# Patient Record
Sex: MALE | Race: WHITE | Employment: FULL TIME | URBAN - METROPOLITAN AREA
[De-identification: names, ages, dates, MRNs, and addresses within clinical notes are randomized per-mention and may not be internally consistent; named-entity substitution may affect disease eponyms.]

---

## 2017-01-13 DIAGNOSIS — Z30.011 ENCOUNTER FOR INITIAL PRESCRIPTION OF CONTRACEPTIVE PILLS: ICD-10-CM

## 2017-01-13 RX ORDER — NORETHINDRONE ACETATE AND ETHINYL ESTRADIOL 1MG-20(21)
1 KIT ORAL DAILY
Qty: 3 PACKET | Refills: 0 | Status: SHIPPED | OUTPATIENT
Start: 2017-01-13 | End: 2017-06-12 | Stop reason: SDUPTHER

## 2017-02-09 DIAGNOSIS — F41.9 ANXIETY: ICD-10-CM

## 2017-02-09 RX ORDER — LAMOTRIGINE 150 MG/1
150 TABLET ORAL DAILY
Qty: 90 TABLET | Refills: 1 | Status: SHIPPED | OUTPATIENT
Start: 2017-02-09 | End: 2017-05-31 | Stop reason: SDUPTHER

## 2017-02-09 RX ORDER — TRAZODONE HYDROCHLORIDE 50 MG/1
50 TABLET ORAL NIGHTLY
Qty: 90 TABLET | Refills: 1 | Status: SHIPPED | OUTPATIENT
Start: 2017-02-09 | End: 2017-06-12 | Stop reason: SDUPTHER

## 2017-05-31 DIAGNOSIS — F41.9 ANXIETY: ICD-10-CM

## 2017-05-31 RX ORDER — LAMOTRIGINE 150 MG/1
TABLET ORAL
Qty: 30 TABLET | Refills: 0 | Status: SHIPPED | OUTPATIENT
Start: 2017-05-31 | End: 2017-06-12 | Stop reason: SDUPTHER

## 2017-06-12 DIAGNOSIS — F41.9 ANXIETY: ICD-10-CM

## 2017-06-12 DIAGNOSIS — Z30.011 ENCOUNTER FOR INITIAL PRESCRIPTION OF CONTRACEPTIVE PILLS: ICD-10-CM

## 2017-06-12 RX ORDER — LAMOTRIGINE 150 MG/1
150 TABLET ORAL DAILY
Qty: 90 TABLET | Refills: 1 | Status: SHIPPED | OUTPATIENT
Start: 2017-06-12 | End: 2017-06-15 | Stop reason: SDUPTHER

## 2017-06-12 RX ORDER — NORETHINDRONE ACETATE AND ETHINYL ESTRADIOL 1MG-20(21)
1 KIT ORAL DAILY
Qty: 3 PACKET | Refills: 0 | Status: SHIPPED | OUTPATIENT
Start: 2017-06-12 | End: 2017-07-11 | Stop reason: SDUPTHER

## 2017-06-12 RX ORDER — TRAZODONE HYDROCHLORIDE 50 MG/1
50 TABLET ORAL NIGHTLY
Qty: 90 TABLET | Refills: 1 | Status: SHIPPED | OUTPATIENT
Start: 2017-06-12 | End: 2017-07-23 | Stop reason: SDUPTHER

## 2017-06-15 ENCOUNTER — OFFICE VISIT (OUTPATIENT)
Dept: FAMILY MEDICINE CLINIC | Age: 22
End: 2017-06-15

## 2017-06-15 VITALS
DIASTOLIC BLOOD PRESSURE: 86 MMHG | HEIGHT: 62 IN | SYSTOLIC BLOOD PRESSURE: 130 MMHG | OXYGEN SATURATION: 99 % | BODY MASS INDEX: 30.84 KG/M2 | TEMPERATURE: 98.6 F | HEART RATE: 90 BPM | WEIGHT: 167.6 LBS

## 2017-06-15 DIAGNOSIS — E55.9 VITAMIN D DEFICIENCY: Primary | ICD-10-CM

## 2017-06-15 DIAGNOSIS — E55.9 VITAMIN D DEFICIENCY: ICD-10-CM

## 2017-06-15 DIAGNOSIS — F41.9 ANXIETY: ICD-10-CM

## 2017-06-15 DIAGNOSIS — F51.04 PSYCHOPHYSIOLOGICAL INSOMNIA: ICD-10-CM

## 2017-06-15 LAB
A/G RATIO: 1.3 (ref 1.1–2.2)
ALBUMIN SERPL-MCNC: 4.5 G/DL (ref 3.4–5)
ALP BLD-CCNC: 67 U/L (ref 40–129)
ALT SERPL-CCNC: 9 U/L (ref 10–40)
ANION GAP SERPL CALCULATED.3IONS-SCNC: 18 MMOL/L (ref 3–16)
AST SERPL-CCNC: 10 U/L (ref 15–37)
BASOPHILS ABSOLUTE: 0.1 K/UL (ref 0–0.2)
BASOPHILS RELATIVE PERCENT: 0.6 %
BILIRUB SERPL-MCNC: 0.3 MG/DL (ref 0–1)
BUN BLDV-MCNC: 8 MG/DL (ref 7–20)
CALCIUM SERPL-MCNC: 9.6 MG/DL (ref 8.3–10.6)
CHLORIDE BLD-SCNC: 101 MMOL/L (ref 99–110)
CO2: 22 MMOL/L (ref 21–32)
CREAT SERPL-MCNC: 0.6 MG/DL (ref 0.6–1.1)
EOSINOPHILS ABSOLUTE: 0 K/UL (ref 0–0.6)
EOSINOPHILS RELATIVE PERCENT: 0.5 %
GFR AFRICAN AMERICAN: >60
GFR NON-AFRICAN AMERICAN: >60
GLOBULIN: 3.4 G/DL
GLUCOSE BLD-MCNC: 85 MG/DL (ref 70–99)
HCT VFR BLD CALC: 43.7 % (ref 36–48)
HEMOGLOBIN: 14 G/DL (ref 12–16)
LYMPHOCYTES ABSOLUTE: 2.3 K/UL (ref 1–5.1)
LYMPHOCYTES RELATIVE PERCENT: 25.3 %
MCH RBC QN AUTO: 27.3 PG (ref 26–34)
MCHC RBC AUTO-ENTMCNC: 31.9 G/DL (ref 31–36)
MCV RBC AUTO: 85.4 FL (ref 80–100)
MONOCYTES ABSOLUTE: 0.6 K/UL (ref 0–1.3)
MONOCYTES RELATIVE PERCENT: 6.2 %
NEUTROPHILS ABSOLUTE: 6.3 K/UL (ref 1.7–7.7)
NEUTROPHILS RELATIVE PERCENT: 67.4 %
PDW BLD-RTO: 14.2 % (ref 12.4–15.4)
PLATELET # BLD: 284 K/UL (ref 135–450)
PMV BLD AUTO: 8.3 FL (ref 5–10.5)
POTASSIUM SERPL-SCNC: 3.6 MMOL/L (ref 3.5–5.1)
RBC # BLD: 5.12 M/UL (ref 4–5.2)
SODIUM BLD-SCNC: 141 MMOL/L (ref 136–145)
TOTAL PROTEIN: 7.9 G/DL (ref 6.4–8.2)
TSH SERPL DL<=0.05 MIU/L-ACNC: 1.57 UIU/ML (ref 0.27–4.2)
VITAMIN D 25-HYDROXY: 19.3 NG/ML
WBC # BLD: 9.3 K/UL (ref 4–11)

## 2017-06-15 PROCEDURE — 99214 OFFICE O/P EST MOD 30 MIN: CPT | Performed by: FAMILY MEDICINE

## 2017-06-15 RX ORDER — LAMOTRIGINE 200 MG/1
200 TABLET ORAL DAILY
Qty: 90 TABLET | Refills: 1 | Status: SHIPPED | OUTPATIENT
Start: 2017-06-15 | End: 2020-12-16 | Stop reason: SDUPTHER

## 2017-06-15 ASSESSMENT — ENCOUNTER SYMPTOMS
GASTROINTESTINAL NEGATIVE: 1
EYES NEGATIVE: 1
RESPIRATORY NEGATIVE: 1

## 2017-07-11 ENCOUNTER — OFFICE VISIT (OUTPATIENT)
Dept: PSYCHOLOGY | Age: 22
End: 2017-07-11

## 2017-07-11 DIAGNOSIS — F32.89 DEPRESSIVE DISORDER, NOT ELSEWHERE CLASSIFIED: ICD-10-CM

## 2017-07-11 DIAGNOSIS — Z30.011 ENCOUNTER FOR INITIAL PRESCRIPTION OF CONTRACEPTIVE PILLS: ICD-10-CM

## 2017-07-11 DIAGNOSIS — F41.1 GENERALIZED ANXIETY DISORDER: Primary | ICD-10-CM

## 2017-07-11 PROCEDURE — 90791 PSYCH DIAGNOSTIC EVALUATION: CPT | Performed by: PSYCHOLOGIST

## 2017-07-11 RX ORDER — NORETHINDRONE ACETATE AND ETHINYL ESTRADIOL 1MG-20(21)
1 KIT ORAL DAILY
Qty: 3 PACKET | Refills: 1 | Status: SHIPPED | OUTPATIENT
Start: 2017-07-11 | End: 2017-12-15 | Stop reason: SDUPTHER

## 2017-07-11 ASSESSMENT — ANXIETY QUESTIONNAIRES
5. BEING SO RESTLESS THAT IT IS HARD TO SIT STILL: 3-NEARLY EVERY DAY
2. NOT BEING ABLE TO STOP OR CONTROL WORRYING: 3-NEARLY EVERY DAY
7. FEELING AFRAID AS IF SOMETHING AWFUL MIGHT HAPPEN: 3-NEARLY EVERY DAY
GAD7 TOTAL SCORE: 21
1. FEELING NERVOUS, ANXIOUS, OR ON EDGE: 3-NEARLY EVERY DAY
4. TROUBLE RELAXING: 3-NEARLY EVERY DAY
3. WORRYING TOO MUCH ABOUT DIFFERENT THINGS: 3-NEARLY EVERY DAY
6. BECOMING EASILY ANNOYED OR IRRITABLE: 3-NEARLY EVERY DAY

## 2017-07-11 ASSESSMENT — PATIENT HEALTH QUESTIONNAIRE - PHQ9
SUM OF ALL RESPONSES TO PHQ9 QUESTIONS 1 & 2: 2
9. THOUGHTS THAT YOU WOULD BE BETTER OFF DEAD, OR OF HURTING YOURSELF: 0
2. FEELING DOWN, DEPRESSED OR HOPELESS: 1
7. TROUBLE CONCENTRATING ON THINGS, SUCH AS READING THE NEWSPAPER OR WATCHING TELEVISION: 2
1. LITTLE INTEREST OR PLEASURE IN DOING THINGS: 1
6. FEELING BAD ABOUT YOURSELF - OR THAT YOU ARE A FAILURE OR HAVE LET YOURSELF OR YOUR FAMILY DOWN: 1
4. FEELING TIRED OR HAVING LITTLE ENERGY: 3
3. TROUBLE FALLING OR STAYING ASLEEP: 3
5. POOR APPETITE OR OVEREATING: 0
8. MOVING OR SPEAKING SO SLOWLY THAT OTHER PEOPLE COULD HAVE NOTICED. OR THE OPPOSITE, BEING SO FIGETY OR RESTLESS THAT YOU HAVE BEEN MOVING AROUND A LOT MORE THAN USUAL: 2
SUM OF ALL RESPONSES TO PHQ QUESTIONS 1-9: 13

## 2017-07-17 ENCOUNTER — TELEPHONE (OUTPATIENT)
Dept: PSYCHIATRY | Age: 22
End: 2017-07-17

## 2017-07-23 DIAGNOSIS — F51.04 PSYCHOPHYSIOLOGICAL INSOMNIA: Primary | ICD-10-CM

## 2017-07-23 RX ORDER — TRAZODONE HYDROCHLORIDE 50 MG/1
50 TABLET ORAL NIGHTLY
Qty: 90 TABLET | Refills: 1 | Status: SHIPPED | OUTPATIENT
Start: 2017-07-23 | End: 2020-12-16

## 2017-08-01 ENCOUNTER — RX ONLY (OUTPATIENT)
Age: 22
Setting detail: RX ONLY
End: 2017-08-01

## 2017-08-01 ENCOUNTER — CLINICAL DOCUMENTATION (OUTPATIENT)
Dept: PSYCHOLOGY | Age: 22
End: 2017-08-01

## 2017-12-15 DIAGNOSIS — Z30.011 ENCOUNTER FOR INITIAL PRESCRIPTION OF CONTRACEPTIVE PILLS: ICD-10-CM

## 2017-12-15 RX ORDER — NORETHINDRONE ACETATE AND ETHINYL ESTRADIOL 1MG-20(21)
1 KIT ORAL DAILY
Qty: 3 PACKET | Refills: 0 | Status: SHIPPED | OUTPATIENT
Start: 2017-12-15 | End: 2020-12-16

## 2017-12-15 NOTE — TELEPHONE ENCOUNTER
Same thing. I sent in three  month supply of your medication. Please schedule a follow up appointment with me or Denver Morocho for a pap .

## 2020-11-06 ENCOUNTER — HOSPITAL ENCOUNTER (EMERGENCY)
Age: 25
Discharge: HOME | End: 2020-11-06
Attending: EMERGENCY MEDICINE
Payer: COMMERCIAL

## 2020-11-06 VITALS
RESPIRATION RATE: 15 BRPM | TEMPERATURE: 98.2 F | WEIGHT: 132.9 LBS | HEART RATE: 77 BPM | OXYGEN SATURATION: 99 % | SYSTOLIC BLOOD PRESSURE: 134 MMHG | BODY MASS INDEX: 24.31 KG/M2 | DIASTOLIC BLOOD PRESSURE: 70 MMHG

## 2020-11-06 PROCEDURE — U0003 INFECTIOUS AGENT DETECTION BY NUCLEIC ACID (DNA OR RNA); SEVERE ACUTE RESPIRATORY SYNDROME CORONAVIRUS 2 (SARS-COV-2) (CORONAVIRUS DISEASE [COVID-19]), AMPLIFIED PROBE TECHNIQUE, MAKING USE OF HIGH THROUGHPUT TECHNOLOGIES AS DESCRIBED BY CMS-2020-01-R: HCPCS

## 2020-11-06 PROCEDURE — 99283 EMERGENCY DEPT VISIT LOW MDM: CPT

## 2020-11-06 PROCEDURE — U0002 COVID-19 LAB TEST NON-CDC: HCPCS

## 2020-11-06 RX ORDER — BENZONATATE 100 MG/1
100 CAPSULE ORAL 3 TIMES DAILY PRN
Qty: 30 CAPSULE | Refills: 0 | Status: SHIPPED | OUTPATIENT
Start: 2020-11-06 | End: 2020-11-13

## 2020-11-06 NOTE — ED NOTES
Patient to ed with complaints of URI symptoms cough, congestion, nasal congestion which started a week ago and continued.      Joann Gonzalez RN  11/06/20 9762

## 2020-11-06 NOTE — ED NOTES
Patient given prescription, work note, discharge instructions verbal and written, patient verbalized understanding. Alert/oriented X4, Clear speech.   Patient exhibits no distress, ambulates with steady gait per self leaving unit, no further request.     Lore Hines RN  11/06/20 [id number]

## 2020-11-06 NOTE — ED PROVIDER NOTES
Emergency Physician Note    Chief Complaint  Cough       History of Present Illness  Lyn Del Rosario is a 31 y.o. female who presents to the ED for cough. Patient reports cough and body aches for the last week. Tested for COVID-19 prior to the recent trip but returned with cough and body aches. Also has a sore throat and runny nose. No fevers. Patient tried to get a primary physician but was sent to urgent care who directed the patient here. No chest pain or shortness of breath. 10 systems reviewed, pertinent positives per HPI otherwise noted to be negative    I have reviewed the following from the nursing documentation:      Prior to Admission medications    Medication Sig Start Date End Date Taking? Authorizing Provider   traZODone (DESYREL) 50 MG tablet Take 1 tablet by mouth nightly 7/23/17  Yes Janeth Jones MD   lamoTRIgine (LAMICTAL) 200 MG tablet Take 1 tablet by mouth daily 6/15/17  Yes Janeth Jones MD   norethindrone-ethinyl estradiol ([facility] FE 1/20) 1-20 MG-MCG per tablet Take 1 tablet by mouth daily 12/15/17   Janeth Jones MD       Allergies as of 11/06/2020 - Review Complete 11/06/2020   Allergen Reaction Noted    Fish-derived products Hives 12/18/2014       History reviewed. No pertinent past medical history.      Surgical History:   Past Surgical History:   Procedure Laterality Date    OTHER SURGICAL HISTORY      fetal twin        Family History:    Family History   Problem Relation Age of Onset    Depression Mother     High Blood Pressure Mother     Cancer Father         skin    Mental Illness Father     Depression Father     Depression Maternal Grandmother         suicide    Early Death Maternal Grandmother     High Blood Pressure Maternal Grandmother     High Cholesterol Maternal Grandmother     Mental Illness Maternal Grandmother     Arthritis Maternal Grandfather     Heart Disease Maternal Grandfather     Kidney Disease Maternal Grandfather     Cancer Maternal SpO2 99 %      Weight 132 lb 14.4 oz (60.3 kg)      Height       Head Circumference       Peak Flow       Pain Score       Pain Loc       Pain Edu? Excl. in [address]? GENERAL:  Awake, alert. Well developed, well nourished with no apparent distress. HENT:  Normocephalic, Atraumatic, moist mucous membranes. Posterior oropharynx lightly erythematous with no edema or exudate. EYES:  Pupils equal round and reactive to light, Conjunctiva normal, extraocular movements normal.  NECK:  No meningeal signs, Supple. CHEST:  Regular rate and rhythm, chest wall non-tender. LUNGS:  Clear to auscultation bilaterally. ABDOMEN:  Soft, non-tender, no rebound, rigidity or guarding, non-distended, normal bowel sounds. No costovertebral angle tenderness to palpation. BACK:  No tenderness. EXTREMITIES:  Normal range of motion, no edema, no bony tenderness, no deformity, distal pulses present. SKIN: Warm, dry and intact. NEUROLOGIC: Normal mental status. Moving all extremities to command. LABS  Labs Reviewed   COVID-19     MEDICAL DECISION MAKING     I advised the patient to return to the emergency department immediately for any new or worsening symptoms, such as fever, chest pain or shortness of breath. The patient voiced agreement and understanding of the treatment plan. No results found for this visit on 11/06/20. I estimate there is LOW risk for EPIGLOTTITIS, PNEUMONIA, MENINGITIS, OR URINARY TRACT INFECTION, thus I consider the discharge disposition reasonable. Also, there is no evidence or peritonitis, sepsis, or toxicity. Rosamaria Castano and I have discussed the diagnosis and risks, and we agree with discharging home to follow-up with their primary doctor. We also discussed returning to the Emergency Department immediately if new or worsening symptoms occur.  We have discussed the symptoms which are most concerning (e.g., changing or worsening pain, trouble swallowing or breating, neck stiffness, fever) that necessitate immediate return. Final Impression    1. Acute upper respiratory infection        Discharge Vital Signs:  Blood pressure 134/70, pulse 77, temperature 98.2 °F (36.8 °C), temperature source Oral, resp. rate 15, weight 132 lb 14.4 oz (60.3 kg), SpO2 99 %, not currently breastfeeding. Patient was given scripts for the following medications. I counseled patient how to take these medications. New Prescriptions    BENZONATATE (TESSALON PERLES) 100 MG CAPSULE    Take 1 capsule by mouth 3 times daily as needed for Cough       Disposition  Pt is in good condition upon Discharge to home. This chart was generated using the [address] [place], [place] [address]Th  dictation system. I created this record but it may contain dictation errors.           Shanice Liriano MD  11/06/20 0013

## 2020-11-07 ENCOUNTER — CARE COORDINATION (OUTPATIENT)
Dept: CARE COORDINATION | Age: 25
End: 2020-11-07

## 2020-11-07 LAB — SARS-COV-2, PCR: NOT DETECTED

## 2020-12-16 ENCOUNTER — VIRTUAL VISIT (OUTPATIENT)
Dept: PRIMARY CARE CLINIC | Age: 25
End: 2020-12-16

## 2020-12-16 PROBLEM — F31.4 BIPOLAR DISORDER WITH SEVERE DEPRESSION (HCC): Status: ACTIVE | Noted: 2020-12-16

## 2020-12-16 PROBLEM — F64.9 GENDER DYSPHORIA: Status: ACTIVE | Noted: 2020-06-17

## 2020-12-16 PROBLEM — R53.83 FATIGUE: Status: ACTIVE | Noted: 2020-06-17

## 2020-12-16 PROCEDURE — 99204 OFFICE O/P NEW MOD 45 MIN: CPT | Performed by: NURSE PRACTITIONER

## 2020-12-16 PROCEDURE — G8431 POS CLIN DEPRES SCRN F/U DOC: HCPCS | Performed by: NURSE PRACTITIONER

## 2020-12-16 RX ORDER — TESTOSTERONE CYPIONATE 200 MG/ML
200 INJECTION INTRAMUSCULAR
COMMUNITY
Start: 2020-06-17

## 2020-12-16 RX ORDER — ALBUTEROL SULFATE 90 UG/1
2 AEROSOL, METERED RESPIRATORY (INHALATION) EVERY 6 HOURS PRN
Qty: 1 INHALER | Refills: 3 | Status: SHIPPED | OUTPATIENT
Start: 2020-12-16

## 2020-12-16 RX ORDER — LAMOTRIGINE 200 MG/1
200 TABLET ORAL DAILY
Qty: 30 TABLET | Refills: 1 | Status: SHIPPED | OUTPATIENT
Start: 2020-12-16

## 2020-12-16 SDOH — ECONOMIC STABILITY: FOOD INSECURITY: WITHIN THE PAST 12 MONTHS, THE FOOD YOU BOUGHT JUST DIDN'T LAST AND YOU DIDN'T HAVE MONEY TO GET MORE.: NEVER TRUE

## 2020-12-16 SDOH — ECONOMIC STABILITY: FOOD INSECURITY: WITHIN THE PAST 12 MONTHS, YOU WORRIED THAT YOUR FOOD WOULD RUN OUT BEFORE YOU GOT MONEY TO BUY MORE.: NEVER TRUE

## 2020-12-16 SDOH — ECONOMIC STABILITY: INCOME INSECURITY: HOW HARD IS IT FOR YOU TO PAY FOR THE VERY BASICS LIKE FOOD, HOUSING, MEDICAL CARE, AND HEATING?: NOT HARD AT ALL

## 2020-12-16 SDOH — ECONOMIC STABILITY: TRANSPORTATION INSECURITY
IN THE PAST 12 MONTHS, HAS LACK OF TRANSPORTATION KEPT YOU FROM MEETINGS, WORK, OR FROM GETTING THINGS NEEDED FOR DAILY LIVING?: NO

## 2020-12-16 SDOH — ECONOMIC STABILITY: TRANSPORTATION INSECURITY
IN THE PAST 12 MONTHS, HAS THE LACK OF TRANSPORTATION KEPT YOU FROM MEDICAL APPOINTMENTS OR FROM GETTING MEDICATIONS?: NO

## 2020-12-16 ASSESSMENT — ENCOUNTER SYMPTOMS
BLOOD IN STOOL: 0
ABDOMINAL DISTENTION: 0
NAUSEA: 0
VOMITING: 0
DIARRHEA: 0
TROUBLE SWALLOWING: 0
SHORTNESS OF BREATH: 0
VOICE CHANGE: 0
EYE PAIN: 0
CONSTIPATION: 0
CHEST TIGHTNESS: 0
BACK PAIN: 0
WHEEZING: 0
EYE DISCHARGE: 0
COUGH: 0
SORE THROAT: 0
COLOR CHANGE: 0
ABDOMINAL PAIN: 0
EYE ITCHING: 0

## 2020-12-16 ASSESSMENT — PATIENT HEALTH QUESTIONNAIRE - PHQ9
4. FEELING TIRED OR HAVING LITTLE ENERGY: 3
1. LITTLE INTEREST OR PLEASURE IN DOING THINGS: 0
5. POOR APPETITE OR OVEREATING: 3
SUM OF ALL RESPONSES TO PHQ QUESTIONS 1-9: 17
10. IF YOU CHECKED OFF ANY PROBLEMS, HOW DIFFICULT HAVE THESE PROBLEMS MADE IT FOR YOU TO DO YOUR WORK, TAKE CARE OF THINGS AT HOME, OR GET ALONG WITH OTHER PEOPLE: 2
SUM OF ALL RESPONSES TO PHQ9 QUESTIONS 1 & 2: 3
9. THOUGHTS THAT YOU WOULD BE BETTER OFF DEAD, OR OF HURTING YOURSELF: 0
8. MOVING OR SPEAKING SO SLOWLY THAT OTHER PEOPLE COULD HAVE NOTICED. OR THE OPPOSITE, BEING SO FIGETY OR RESTLESS THAT YOU HAVE BEEN MOVING AROUND A LOT MORE THAN USUAL: 0
2. FEELING DOWN, DEPRESSED OR HOPELESS: 3
SUM OF ALL RESPONSES TO PHQ QUESTIONS 1-9: 17
SUM OF ALL RESPONSES TO PHQ QUESTIONS 1-9: 17
6. FEELING BAD ABOUT YOURSELF - OR THAT YOU ARE A FAILURE OR HAVE LET YOURSELF OR YOUR FAMILY DOWN: 3
3. TROUBLE FALLING OR STAYING ASLEEP: 3
7. TROUBLE CONCENTRATING ON THINGS, SUCH AS READING THE NEWSPAPER OR WATCHING TELEVISION: 2

## 2020-12-16 ASSESSMENT — COLUMBIA-SUICIDE SEVERITY RATING SCALE - C-SSRS
6. HAVE YOU EVER DONE ANYTHING, STARTED TO DO ANYTHING, OR PREPARED TO DO ANYTHING TO END YOUR LIFE?: NO
2. HAVE YOU ACTUALLY HAD ANY THOUGHTS OF KILLING YOURSELF?: NO
1. WITHIN THE PAST MONTH, HAVE YOU WISHED YOU WERE DEAD OR WISHED YOU COULD GO TO SLEEP AND NOT WAKE UP?: NO

## 2020-12-16 NOTE — PROGRESS NOTES
2020        TELEHEALTH EVALUATION -- Audio/Visual ([facility])    HPI:    Tracy Dominguez (:  1995) has requested an audio/video evaluation for the following concern(s):    Patient seen virtually as a new patient. Has several complaints. Previous PCP was in [place], he wants to change PCP. He has had trouble scheduling apts with PCP. Still follows with [facility] for HRT    S. O.B:  Intermittent. Has been present since . Has ntermittent shortness of breath with activity. No wheezing. Has been coughing. Cough is dry. Will tire easily with minimal activty such as walking through the grocery store. Feels very fatigued. Seen in ED  for cough and body aches. Had low fever, congestion   covid test was negative. He was dx with URI and sent home with rx for tessalon. H/o history of asthma as a child. Has used his wife's inhaler and seems to help at times. WEIGHT:  Has lost \"80 lbs over the past few months\". No vomiting. No diarrhea. Has trouble eating. No appetite. Feels full quickly. Some nausea. Has been very depressed and not feeling well in general.  Very fatigued. Records in care everywhere notes he spoke with previous PCP re: wt loss in September but he never completed testing as ordered. PSYC:   PHQ9 positive. Has been dx with bipolar disorder, depression and anxiety. Never been suicidal. Depression has been pretty severe lately. Hasn't seen psyc for 2-3 years. Felt like mood was stable until a few months ago. On lamictal. Has been on several medications in the past.   SSRIs worsen his anxiety. trazadone does not help him sleep. [place] like his physical illness in November has triggered his worsening depression s/s. Does have a good support system. Is . 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 They have not allowed him time off work to [employer]" from his illness in November and now his depression is so severe, he would like to take \"some time off to recover from everything and he is just not well\". He has used all of his PTO, they have encrouaged he apply for STD/FMLA. He is asking to take 2 wks off work. He feels he can't \"get better due to the high demands of his job\"    GENDER:  On testosterone injections  Has completed gender transition female to male. Sees specialist at [facility]. Sees every 6mo  Has labs every 6mo  No longer on birth control. Is on \"estrogen blocker\". FH:  +leukemia (GM), lung CA. No DMII. No CAD. Review of Systems   Constitutional: Negative for activity change, appetite change, chills, fatigue and unexpected weight change. HENT: Negative for congestion, ear pain, hearing loss, nosebleeds, postnasal drip, sneezing, sore throat, trouble swallowing and voice change. Eyes: Negative for pain, discharge and itching. Respiratory: Negative for cough, chest tightness, shortness of breath and wheezing. Cardiovascular: Negative for chest pain, palpitations and leg swelling. Gastrointestinal: Negative for abdominal distention, abdominal pain, blood in stool, constipation, diarrhea, nausea and vomiting. Endocrine: Negative for cold intolerance and heat intolerance. Genitourinary: Negative for difficulty urinating and dysuria. Musculoskeletal: Negative for arthralgias, back pain and neck pain. Skin: Negative for color change, pallor and rash. Neurological: Negative for dizziness, tremors, numbness and headaches. Hematological: Does not bruise/bleed easily. Psychiatric/Behavioral: Negative for agitation and sleep disturbance. The patient is not nervous/anxious. Prior to Visit Medications    Medication Sig Taking?  Authorizing Provider Neurological:        [x] No Facial Asymmetry (Cranial nerve 7 motor function) (limited exam to video visit)          [x] No gaze palsy        [] Abnormal-         Skin:        [x] No significant exanthematous lesions or discoloration noted on facial skin         [] Abnormal-            Psychiatric:       [] Normal Affect [x] No Hallucinations        [] Abnormal- PATIENT TEARFUL, SOFT SPOKEN, DEPRESSED MOOD, ANXIOUS    Other pertinent observable physical exam findings-     PHQ-9 Total Score: 17 (12/16/2020  5:44 PM)  Thoughts that you would be better off dead, or of hurting yourself in some way: 0 (12/16/2020  5:44 PM)    On the basis of positive PHQ-9 screening (PHQ-9 Total Score: 17), the following plan was implemented: referral to Behavioral health provided. Patient will follow-up in 2 week(s) with psychiatrist and PCP    ASSESSMENT/PLAN:  1. Unexplained weight loss  CHECK LABS. PROCEED PENDING RESULTS. CHECK CXR. IF NEGATIVE WORKUP, WILL CONSIDER ABD/PELVIS CT AND/OR GI REFERRAL FOR FURTHER WORKUP.   - CBC; Future  - Comprehensive Metabolic Panel; Future  - TSH without Reflex; Future  - T4, Free; Future  - HIV Screen; Future    2. Bipolar disorder with severe depression ([facility] [address]  REFER TO [facility] MANAGEMENT. WILL MAIL LIST OF PROVIDERS. ENCOURAGED TO SCHEDULE APT ASAP. DISCUSSED POSSIBILITY OF SEVERE DEPRESSION CONTRIBUTING TO SIGNIFICANT WT LOSS. PATIENT NOT MENTALLY STABLE AT THIS TIME. WILL SUPPORT HIM TAKING TIME OFF OF WORK. WILL COMPLETE STD/FMLA PAPERS. WILL SUPPORT YEE X 2 WKS, THEN REASSESS. ADVISED IF ANY SI/PLAN DEVELOPS, GO TO ED. Std will fax forms. Patient will email fmla papers. YEE 12/21 THROUGH 1/4    - lamoTRIgine (LAMICTAL) 200 MG tablet; Take 1 tablet by mouth daily  Dispense: 30 tablet; Refill: 1  - Amb External Referral To Psychiatry  - Positive Screen for Clinical Depression with a Documented Follow-up Plan     3.  Gender dysphoria Debby Nickerson is a 31 y.o. adult being evaluated by a Virtual Visit (video visit) encounter to address concerns as mentioned above. A caregiver was present when appropriate. Due to this being a TeleHealth encounter (During [facility]-21 public health emergency), evaluation of the following organ systems was limited: Vitals/Constitutional/EENT/Resp/CV/GI//MS/Neuro/Skin/Heme-Lymph-Imm. Pursuant to the emergency declaration under the [address] [place] [address], [address] [place] [address] and the [employer] Act, this Virtual Visit was conducted with patient's (and/or legal guardian's) consent, to reduce the patient's risk of exposure to COVID-19 and provide necessary medical care. The patient (and/or legal guardian) has also been advised to contact this office for worsening conditions or problems, and seek emergency medical treatment and/or call 911 if deemed necessary. Patient identification was verified at the start of the visit: Yes    Total time spent on this encounter: 60 min     Services were provided through a video synchronous discussion virtually to substitute for in-person clinic visit. Patient and provider were located at their individual homes. --JOHANNE Page CNP on 12/16/2020 at 7:15 PM    An electronic signature was used to authenticate this note.

## 2020-12-17 ENCOUNTER — TELEPHONE (OUTPATIENT)
Dept: PRIMARY CARE CLINIC | Age: 25
End: 2020-12-17

## 2020-12-17 NOTE — PATIENT INSTRUCTIONS
Check labs asap. Orders entered. Check chest x ray. Order entered. Call to schedule apt with psychiatrist asap. See enclosed list of providers. If any suicidal thoughts/plan go to ED immediately. If labs and CXR are negative, will check ECHO. Will complete STD forms once received. Will complete FLMA papers once received. Psychiatry    Psychology Today  Resource to find providers  Www. [email or web address]. [facility]  [phone]    [address] [place] [address] [facility] [address]   [place], [address] [facility]   Phone: [phone]    Fax: [phone] Psychological [facility] Counseling Services  [address]   [phone]    MD Dr. Suy[address]ona [address]., MD Joeline Nicks, MD  [address],   [address]   [phone]    Alecia Keane, PhD  Apple Stein, PhD  Dayanna Bush, PhD  [address] [facility]  [phone]    [address] [place] [address] [address]  [phone]    [address], [address] [facility]  [phone]    Dr. Radha Ramsay  [phone] (outpatient)      Dr. Nichelle Cobb, [place]  [phone]    Dr. Isabelle Hatch MD  [address] [facility].  [address]  [phone]    Dr. Cj Funes MD  [facility] [address]  [address]    Diana Munoz MD   [phone]    [facility] for [facility]  [phone]    [facility]  Dr. Kelby Ross  [phone] [address] [facility] [address], [address] [place] [address].   [place], [address] [place] [address]  Phone [phone], Fax [phone]    Substance Abuse  [facility] [address]  Phone  [phone]  Walk-in treatment assessments Monday- Friday 8AM-2PM      familia [address] [place] [address].   [address]  Phone [phone]

## 2020-12-17 NOTE — TELEPHONE ENCOUNTER
[employer] called into verify the condition of the patient and that he was seen for shortness of breath and other medical information. Reference for the claim # is [id number]. Would like a call back to confirm.

## 2020-12-21 NOTE — TELEPHONE ENCOUNTER
Form updated and returned to patient. Also received STD forms, completed, scanned to chart and faxed. Will scan copy to patient as well.

## 2020-12-29 ENCOUNTER — HOSPITAL ENCOUNTER (OUTPATIENT)
Dept: GENERAL RADIOLOGY | Age: 25
Discharge: HOME | End: 2020-12-29
Payer: COMMERCIAL

## 2020-12-29 ENCOUNTER — HOSPITAL ENCOUNTER (OUTPATIENT)
Age: 25
Discharge: HOME | End: 2020-12-29
Payer: COMMERCIAL

## 2020-12-29 PROCEDURE — 71046 X-RAY EXAM CHEST 2 VIEWS: CPT

## 2020-12-30 RX ORDER — ERGOCALCIFEROL 1.25 MG/1
50000 CAPSULE ORAL WEEKLY
Qty: 12 CAPSULE | Refills: 1 | Status: SHIPPED | OUTPATIENT
Start: 2020-12-30

## 2021-01-04 ENCOUNTER — OFFICE VISIT (OUTPATIENT)
Dept: PRIMARY CARE CLINIC | Age: 26
End: 2021-01-04
Payer: COMMERCIAL

## 2021-01-04 VITALS
OXYGEN SATURATION: 98 % | BODY MASS INDEX: 24.11 KG/M2 | HEIGHT: 62 IN | HEART RATE: 88 BPM | DIASTOLIC BLOOD PRESSURE: 80 MMHG | TEMPERATURE: 94.7 F | SYSTOLIC BLOOD PRESSURE: 120 MMHG | WEIGHT: 131 LBS

## 2021-01-04 DIAGNOSIS — E55.9 VITAMIN D DEFICIENCY: ICD-10-CM

## 2021-01-04 DIAGNOSIS — F41.9 ANXIETY: ICD-10-CM

## 2021-01-04 DIAGNOSIS — F31.4 BIPOLAR DISORDER WITH SEVERE DEPRESSION (HCC): ICD-10-CM

## 2021-01-04 DIAGNOSIS — R06.02 SOB (SHORTNESS OF BREATH): Primary | ICD-10-CM

## 2021-01-04 DIAGNOSIS — R53.83 FATIGUE, UNSPECIFIED TYPE: ICD-10-CM

## 2021-01-04 PROCEDURE — 99214 OFFICE O/P EST MOD 30 MIN: CPT | Performed by: NURSE PRACTITIONER

## 2021-01-04 RX ORDER — MIRTAZAPINE 7.5 MG/1
7.5 TABLET, FILM COATED ORAL NIGHTLY
COMMUNITY
Start: 2020-12-21

## 2021-01-04 RX ORDER — HYDROXYZINE PAMOATE 25 MG/1
25 CAPSULE ORAL 3 TIMES DAILY PRN
COMMUNITY
Start: 2020-12-21

## 2021-01-04 RX ORDER — ARIPIPRAZOLE 2 MG/1
2 TABLET ORAL DAILY
COMMUNITY
Start: 2020-12-22

## 2021-01-04 RX ORDER — MIRTAZAPINE 7.5 MG/1
1 TABLET, FILM COATED ORAL NIGHTLY
COMMUNITY
Start: 2020-12-21

## 2021-01-04 ASSESSMENT — ENCOUNTER SYMPTOMS
ABDOMINAL PAIN: 0
SHORTNESS OF BREATH: 0
CONSTIPATION: 0
EYE DISCHARGE: 0
BACK PAIN: 0
CHEST TIGHTNESS: 0
COUGH: 0
EYE PAIN: 0
WHEEZING: 0
DIARRHEA: 0
BLOOD IN STOOL: 0
COLOR CHANGE: 0
VOICE CHANGE: 0
ABDOMINAL DISTENTION: 0
TROUBLE SWALLOWING: 0
EYE ITCHING: 0
NAUSEA: 0
VOMITING: 0
SORE THROAT: 0

## 2021-01-04 ASSESSMENT — PATIENT HEALTH QUESTIONNAIRE - PHQ9
SUM OF ALL RESPONSES TO PHQ QUESTIONS 1-9: 0
SUM OF ALL RESPONSES TO PHQ QUESTIONS 1-9: 0

## 2021-01-04 NOTE — PATIENT INSTRUCTIONS
Continue per specialists. Refer to pulmonology for further evaluation of shortness of breath  Call for apt. Extend YEE from work through 1/25/21. Start on vitamin D rx asap. Closely monitor weight. Eat calorie dense foods.

## 2021-01-04 NOTE — PROGRESS NOTES
ENCOUNTER DATE: 1/4/2021     NAME: Junior Riley   AGE: 31 y.o. GENDER: adult   YOB: 1995    Patient Active Problem List   Diagnosis    Anxiety    Vitamin D deficiency    Nevus    Allergic rhinitis    Generalized anxiety disorder    Psychophysiological insomnia    Gender dysphoria    Fatigue    Bipolar disorder with severe depression ([address]      Allergies   Allergen Reactions    Nicotine Shortness Of Breath    Fish-Derived Products Hives    Shellfish Allergy Hives     Current Outpatient Medications on File Prior to Visit   Medication Sig Dispense Refill    ARIPiprazole (ABILIFY) 2 MG tablet Take 2 mg by mouth daily      mirtazapine (REMERON) 7.5 MG tablet Take 7.5 mg by mouth nightly      hydrOXYzine (VISTARIL) 25 MG capsule Take 25 mg by mouth 3 times daily as needed      vitamin D (ERGOCALCIFEROL) 1.25 MG (82023 UT) CAPS capsule Take 1 capsule by mouth once a week 12 capsule 1    albuterol sulfate HFA (PROVENTIL HFA) 108 (90 Base) MCG/ACT inhaler Inhale 2 puffs into the lungs every 6 hours as needed for Wheezing 1 Inhaler 3    lamoTRIgine (LAMICTAL) 200 MG tablet Take 1 tablet by mouth daily 30 tablet 1    testosterone cypionate (DEPOTESTOTERONE CYPIONATE) 200 MG/ML injection Inject 200 mg into the muscle every 7 days.  mirtazapine (REMERON) 7.5 MG tablet Take 1 tablet by mouth nightly       No current facility-administered medications on file prior to visit.          Social History     Tobacco Use    Smoking status: Never Smoker    Smokeless tobacco: Never Used   Substance Use Topics    Alcohol use: No     Alcohol/week: 0.0 standard drinks      CARE TEAM  Patient Care Team:  JOHANNE Mendieta CNP as PCP - General (Family Nurse Practitioner)  JOHANNE Mendieta CNP as PCP - REHABILITATION [facility] Empaneled Provider    Chief Complaint   Patient presents with    Depression     disabliity   papers  seeing  phyciatrist  tonight    Anxiety    Shortness of Breath        HPI: Patient is here for a 2 wk follow up visit. Seen virtually for initial apt    S. O.B  Intermittent. Has been present since November illness  Recent CXR was normal. Using inhaler prn. Feels like breathing is better, but still shortness of breath with activity and is new for him. Using inhaler prn and feels this helps. No known h/o asthma. WT:  Has lost \"80 lbs over the past few months\". No further wt loss. Has actually gained a few lbs since being discharged from [facility]. FATIGUE:  Has improved slightly, but still feels pretty fatigued. PSYC:  PHQ9 positive. Referred to psyc. He started feeling worse, so he went to [facility] on his own. He was admitted for a few days. Set up apt with psyc and counselor. Has apt today and tomorrow. Started him on remeron 7.5mg at night and abilify and vistaril as needed. Continued on lamictal.   Has been on several medications in the past.   SSRIs worsen his anxiety. trazadone does not help him sleep. Feels like he is stressed, but is overall doing better. Is starting to stress more thinking about going back to work. Had highly demanding job and stressful. Feels like he needs a couple more weeks off for meds to become effective and rest further. VIT D:  Hasn't started rx yet. ROS:  Review of Systems   Constitutional: Negative for activity change, appetite change, chills, fatigue and unexpected weight change. HENT: Negative for congestion, ear pain, hearing loss, nosebleeds, postnasal drip, sneezing, sore throat, trouble swallowing and voice change. Eyes: Negative for pain, discharge and itching. Respiratory: Negative for cough, chest tightness, shortness of breath and wheezing. Cardiovascular: Negative for chest pain, palpitations and leg swelling. Gastrointestinal: Negative for abdominal distention, abdominal pain, blood in stool, constipation, diarrhea, nausea and vomiting.    Endocrine: Negative for cold intolerance and heat intolerance. Genitourinary: Negative for difficulty urinating and dysuria. Musculoskeletal: Negative for arthralgias, back pain and neck pain. Skin: Negative for color change, pallor and rash. Neurological: Negative for dizziness, tremors, numbness and headaches. Hematological: Does not bruise/bleed easily. Psychiatric/Behavioral: Positive for dysphoric mood and sleep disturbance. Negative for agitation, self-injury and suicidal ideas. The patient is nervous/anxious. VITALS:  /80   Pulse 88   Temp 94.7 °F (34.8 °C) (Oral)   Ht 5' 2\" (1.575 m)   Wt 131 lb (59.4 kg)   SpO2 98%   BMI 23.96 kg/m²    Wt Readings from Last 3 Encounters:   01/04/21 131 lb (59.4 kg)   11/06/20 132 lb 14.4 oz (60.3 kg)   06/15/17 167 lb 9.6 oz (76 kg)       PE:  Physical Exam  Vitals signs and nursing note reviewed. Constitutional:       General: He is not in acute distress. Appearance: Normal appearance. He is well-developed and normal weight. He is not diaphoretic. HENT:      Head: Normocephalic and atraumatic. Eyes:      Conjunctiva/sclera: Conjunctivae normal.      Pupils: Pupils are equal, round, and reactive to light. Neck:      Musculoskeletal: Normal range of motion and neck supple. Thyroid: No thyromegaly. Vascular: No carotid bruit or JVD. Trachea: No tracheal deviation. Cardiovascular:      Rate and Rhythm: Normal rate and regular rhythm. Heart sounds: Normal heart sounds. No murmur. No friction rub. Pulmonary:      Effort: Pulmonary effort is normal. No respiratory distress. Breath sounds: Normal breath sounds. No stridor. No wheezing, rhonchi or rales. Chest:      Chest wall: No tenderness. Abdominal:      General: Abdomen is flat. Bowel sounds are normal. There is no distension. Palpations: Abdomen is soft. There is no mass. Tenderness: There is no abdominal tenderness. There is no guarding or rebound. Musculoskeletal: Normal range of motion. General: No deformity. Right lower leg: No edema. Left lower leg: No edema. Lymphadenopathy:      Cervical: No cervical adenopathy. Skin:     General: Skin is warm and dry. Coloration: Skin is not pale. Findings: No erythema or rash. Neurological:      General: No focal deficit present. Mental Status: He is alert and oriented to person, place, and time. Cranial Nerves: No cranial nerve deficit. Motor: No weakness. Gait: Gait normal.   Psychiatric:         Behavior: Behavior normal.         Thought Content: Thought content normal.         Judgment: Judgment normal.      Comments: Depressed mood. Not tearful today          Lab Results   Component Value Date    WBC 5.0 12/29/2020    HGB 15.1 12/29/2020    HCT 45.7 12/29/2020    MCV 87.2 12/29/2020     12/29/2020     Lab Results   Component Value Date     12/29/2020    K 4.5 12/29/2020     12/29/2020    CO2 29 12/29/2020    BUN 9 12/29/2020    CREATININE 0.9 12/29/2020    GLUCOSE 80 12/29/2020    CALCIUM 10.1 12/29/2020    PROT 7.3 12/29/2020    LABALBU 4.9 12/29/2020    BILITOT 0.3 12/29/2020    ALKPHOS 88 12/29/2020    AST 15 12/29/2020    ALT 15 12/29/2020    LABGLOM >60 12/29/2020    GFRAA >60 12/29/2020    AGRATIO 2.0 12/29/2020    GLOB 2.4 12/29/2020     No results found for: [id number]  Lab Results   Component Value Date    TSH 1.07 12/29/2020    T4FREE 1.0 12/29/2020     No results found for: [id number]      ASSESSMENT/PLAN:  1. SOB (shortness of breath)  Recent CXR and labs reviewed and were normal. Since he does get little relief from rescue inhaler, will refer to pulmonology for further workup. [facility] Pulmonology    2. Fatigue, unspecified type  Reviewed and discussed labs. Encouraged rest.     3. Bipolar disorder with severe depression ([address]  Continue per psyc. No records available from [facility]. Seeing psyc and counselor. Med regimen recently adjusted.  Will extend YEE from work until 1/25/21.     4. Vitamin D deficiency  Will  rx and start as directed. 5. Anxiety  Continue per psyc    6. Wt loss  Improving. Will closely monitor. Return in about 2 weeks (around 1/18/2021) for YEE follow up on 1/19 or 1/22.      Electronically signed by JOHANNE Miles CNP on 1/4/2021 at 3:16 PM

## 2021-01-21 ENCOUNTER — OFFICE VISIT (OUTPATIENT)
Dept: PRIMARY CARE CLINIC | Age: 26
End: 2021-01-21
Payer: COMMERCIAL

## 2021-01-21 VITALS
RESPIRATION RATE: 16 BRPM | OXYGEN SATURATION: 98 % | BODY MASS INDEX: 23.41 KG/M2 | WEIGHT: 128 LBS | SYSTOLIC BLOOD PRESSURE: 112 MMHG | HEART RATE: 75 BPM | DIASTOLIC BLOOD PRESSURE: 76 MMHG | TEMPERATURE: 97.3 F

## 2021-01-21 DIAGNOSIS — F31.4 BIPOLAR DISORDER WITH SEVERE DEPRESSION (HCC): ICD-10-CM

## 2021-01-21 DIAGNOSIS — R63.4 UNEXPLAINED WEIGHT LOSS: Primary | ICD-10-CM

## 2021-01-21 DIAGNOSIS — R06.02 SOB (SHORTNESS OF BREATH): ICD-10-CM

## 2021-01-21 DIAGNOSIS — F32.A DEPRESSIVE DISORDER: ICD-10-CM

## 2021-01-21 PROCEDURE — 99214 OFFICE O/P EST MOD 30 MIN: CPT | Performed by: NURSE PRACTITIONER

## 2021-01-21 ASSESSMENT — ENCOUNTER SYMPTOMS
DIARRHEA: 0
SHORTNESS OF BREATH: 0
NAUSEA: 0
TROUBLE SWALLOWING: 0
CHEST TIGHTNESS: 0
BACK PAIN: 0
ABDOMINAL DISTENTION: 0
VOMITING: 0
EYE DISCHARGE: 0
SORE THROAT: 0
VOICE CHANGE: 0
COUGH: 0
COLOR CHANGE: 0
EYE PAIN: 0
EYE ITCHING: 0
BLOOD IN STOOL: 0
ABDOMINAL PAIN: 0
CONSTIPATION: 0
WHEEZING: 0

## 2021-01-21 NOTE — PATIENT INSTRUCTIONS
Refer to GI for further workup for unexplained wt loss. May return to work 1/25 without restrictions. Will complete form and return. Continue per psyc. Continue with pulmonology apt as scheduled.

## 2021-01-21 NOTE — PROGRESS NOTES
ENCOUNTER DATE: 1/21/2021     NAME: Taj Leyva   AGE: 31 y.o. GENDER: adult   YOB: 1995    Patient Active Problem List   Diagnosis    Anxiety    Vitamin D deficiency    Nevus    Allergic rhinitis    Generalized anxiety disorder    Psychophysiological insomnia    Gender dysphoria    Fatigue    Bipolar disorder with severe depression ([facility] [address]      Allergies   Allergen Reactions    Nicotine Shortness Of Breath    Fish-Derived Products Hives    Shellfish Allergy Hives     Current Outpatient Medications on File Prior to Visit   Medication Sig Dispense Refill    ARIPiprazole (ABILIFY) 2 MG tablet Take 2 mg by mouth daily      mirtazapine (REMERON) 7.5 MG tablet Take 7.5 mg by mouth nightly      hydrOXYzine (VISTARIL) 25 MG capsule Take 25 mg by mouth 3 times daily as needed      mirtazapine (REMERON) 7.5 MG tablet Take 1 tablet by mouth nightly      vitamin D (ERGOCALCIFEROL) 1.25 MG (50944 UT) CAPS capsule Take 1 capsule by mouth once a week 12 capsule 1    albuterol sulfate HFA (PROVENTIL HFA) 108 (90 Base) MCG/ACT inhaler Inhale 2 puffs into the lungs every 6 hours as needed for Wheezing 1 Inhaler 3    lamoTRIgine (LAMICTAL) 200 MG tablet Take 1 tablet by mouth daily 30 tablet 1    testosterone cypionate (DEPOTESTOTERONE CYPIONATE) 200 MG/ML injection Inject 200 mg into the muscle every 7 days. No current facility-administered medications on file prior to visit. Social History     Tobacco Use    Smoking status: Never Smoker    Smokeless tobacco: Never Used   Substance Use Topics    Alcohol use: No     Alcohol/week: 0.0 standard drinks      CARE TEAM  Patient Care Team:  JOHANNE Crowley CNP as PCP - General (Family Nurse Practitioner)  JOHANNE Crowley CNP as PCP - [facility] Empaneled Provider    Chief Complaint   Patient presents with    Follow-up      HPI:   Patient is here for a follow up visit.      S.O.B:  Referred to pulmonology for continued shortness of breath. Scheduled for 2/1. Recent CXR was normal. Using inhaler prn. Feels like breathing is better, but still shortness of breath with activity and is new for him. Using inhaler prn and feels this helps. Using inhaler about bid -tid. No known h/o asthma. WT:  Has lost 3 lbs since last visit. Has lost 80 lbs within the past year and has not been trying  Has been eating at least 2,000cal/day. Appetite is good and is eating frequently. Eating snacks. Is overall feeling better. Unsure of family history. Never had colonoscopy. No diarrhea. No n/v/d. No abdominal pain. PSYC:  Recent admission to [facility]  Was started on remeron 7.5mg qhs, abilify and vistaril prn. Takes vistaril only prn. Continued on lamictal.   Feeling more anxious about going back to work, but has overall mood has improved. Sleeping well. No further jittery feeling with the new meds. Was told he should gain wt with new meds. Following with psyc and counselor. Has upcoming apt. YEE was extended to 1/25 to give more time for meds to become more effective and to rest.     VIT D:  rx started. ROS:  Review of Systems   Constitutional: Negative for activity change, appetite change, chills, fatigue and unexpected weight change. HENT: Negative for congestion, ear pain, hearing loss, nosebleeds, postnasal drip, sneezing, sore throat, trouble swallowing and voice change. Eyes: Negative for pain, discharge and itching. Respiratory: Negative for cough, chest tightness, shortness of breath and wheezing. Cardiovascular: Negative for chest pain, palpitations and leg swelling. Gastrointestinal: Negative for abdominal distention, abdominal pain, blood in stool, constipation, diarrhea, nausea and vomiting. Endocrine: Negative for cold intolerance and heat intolerance. Genitourinary: Negative for difficulty urinating and dysuria. Musculoskeletal: Negative for arthralgias, back pain and neck pain. Skin: Negative for color change, pallor and rash. Neurological: Negative for dizziness, tremors, numbness and headaches. Hematological: Does not bruise/bleed easily. Psychiatric/Behavioral: Positive for dysphoric mood. Negative for agitation, self-injury, sleep disturbance and suicidal ideas. The patient is nervous/anxious. VITALS:  /76   Pulse 75   Temp 97.3 °F (36.3 °C)   Resp 16   Wt 128 lb (58.1 kg)   SpO2 98%   BMI 23.41 kg/m²    Wt Readings from Last 3 Encounters:   01/21/21 128 lb (58.1 kg)   01/04/21 131 lb (59.4 kg)   11/06/20 132 lb 14.4 oz (60.3 kg)     PE:  Physical Exam  Vitals signs and nursing note reviewed. Constitutional:       General: He is not in acute distress. Appearance: Normal appearance. He is well-developed and normal weight. He is not diaphoretic. HENT:      Head: Normocephalic and atraumatic. Neck:      Musculoskeletal: Normal range of motion and neck supple. Thyroid: No thyromegaly. Vascular: No carotid bruit or JVD. Trachea: No tracheal deviation. Cardiovascular:      Rate and Rhythm: Normal rate and regular rhythm. Heart sounds: Normal heart sounds. No murmur. No friction rub. Pulmonary:      Effort: Pulmonary effort is normal. No respiratory distress. Breath sounds: Normal breath sounds. No stridor. No wheezing, rhonchi or rales. Chest:      Chest wall: No tenderness. Abdominal:      General: Abdomen is flat. Bowel sounds are normal. There is no distension. Palpations: Abdomen is soft. There is no mass. Tenderness: There is no abdominal tenderness. There is no guarding or rebound. Musculoskeletal: Normal range of motion. General: No deformity. Right lower leg: No edema. Left lower leg: No edema. Lymphadenopathy:      Cervical: No cervical adenopathy. Skin:     General: Skin is warm and dry. Coloration: Skin is not pale. Findings: No erythema or rash.    Neurological: General: No focal deficit present. Mental Status: He is alert and oriented to person, place, and time. Cranial Nerves: No cranial nerve deficit. Motor: No weakness. Gait: Gait normal.   Psychiatric:         Mood and Affect: Mood normal.         Behavior: Behavior normal.         Thought Content: Thought content normal.         Judgment: Judgment normal.      Comments: Much improved today        PA AND LATERAL CHEST 2 VIEWS       INDICATIONS: Shortness of breath       Comparison: None       Trachea is midline. Heart and mediastinal contour are normal.       Lungs and pleural surfaces are clear. Bony structures are normal           Impression   1. No acute cardiopulmonary abnormalities.           Lab Results   Component Value Date    WBC 5.0 12/29/2020    HGB 15.1 12/29/2020    HCT 45.7 12/29/2020    MCV 87.2 12/29/2020     12/29/2020     Lab Results   Component Value Date     12/29/2020    K 4.5 12/29/2020     12/29/2020    CO2 29 12/29/2020    BUN 9 12/29/2020    CREATININE 0.9 12/29/2020    GLUCOSE 80 12/29/2020    CALCIUM 10.1 12/29/2020    PROT 7.3 12/29/2020    LABALBU 4.9 12/29/2020    BILITOT 0.3 12/29/2020    ALKPHOS 88 12/29/2020    AST 15 12/29/2020    ALT 15 12/29/2020    LABGLOM >60 12/29/2020    GFRAA >60 12/29/2020    AGRATIO 2.0 12/29/2020    GLOB 2.4 12/29/2020     Component Value Ref Range & Units Status Collected Lab   HIV Ag/Ab Non-Reactive  Non-reactive Final 12/29/2020 11:20 AM [facility]   HIV-1 Antibody Non-Reactive  Non-reactive Final 12/29/2020 11:20 AM [facility]   HIV ANTIGEN Non-Reactive  Non-reactive Final 12/29/2020 11:20 AM [facility]   HIV-2 Ab Non-Reactive  Non-reactive        Lab Results   Component Value Date    TSH 1.07 12/29/2020    T4FREE 1.0 12/29/2020       ASSESSMENT/PLAN:  1. Unexplained weight loss  Refer to GI for further workup. Reported wt loss of 80lbs in the past year.    Wt has continued to decrease despite his increase in calorie intake. Continue to track calories and follow >2,000cal/day. Add supplement shakes to diet. Unknown family history. ?need for scope/further testing. Contact information given. Will call for apt.   - Chance Madrigal MD, Gastroenterology, [facility]    2. SOB (shortness of breath)  Scheduled with pulmonology 2/1. Continue with inhaler for prn use. No refill needed. 3. Bipolar disorder with severe depression ([address]  Continue per psyc for med management. Doing much better. Ready to return to work  Will complete return to work form and fax back. Release back to work starting 1/25/21. 4. Depressive disorder  Continue per psyc for med management. Return in about 4 weeks (around 2/18/2021) for chronic visit.      Electronically signed by JOHANNE Robles CNP on 1/21/2021 at 10:06 AM

## 2021-02-12 ENCOUNTER — APPOINTMENT (OUTPATIENT)
Dept: GENERAL RADIOLOGY | Age: 26
End: 2021-02-12
Payer: COMMERCIAL

## 2021-02-12 ENCOUNTER — HOSPITAL ENCOUNTER (EMERGENCY)
Age: 26
Discharge: HOME | End: 2021-02-12
Attending: EMERGENCY MEDICINE
Payer: COMMERCIAL

## 2021-02-12 VITALS
BODY MASS INDEX: 23.28 KG/M2 | HEART RATE: 89 BPM | DIASTOLIC BLOOD PRESSURE: 75 MMHG | OXYGEN SATURATION: 100 % | SYSTOLIC BLOOD PRESSURE: 124 MMHG | RESPIRATION RATE: 18 BRPM | TEMPERATURE: 97.9 F | WEIGHT: 127.3 LBS

## 2021-02-12 DIAGNOSIS — R00.2 PALPITATIONS: Primary | ICD-10-CM

## 2021-02-12 DIAGNOSIS — R51.9 ACUTE NONINTRACTABLE HEADACHE, UNSPECIFIED HEADACHE TYPE: ICD-10-CM

## 2021-02-12 LAB
A/G RATIO: 1.7 (ref 1.1–2.2)
ALBUMIN SERPL-MCNC: 4.5 G/DL (ref 3.4–5)
ALP BLD-CCNC: 77 U/L (ref 40–129)
ALT SERPL-CCNC: 15 U/L (ref 10–40)
AMPHETAMINE SCREEN, URINE: ABNORMAL
ANION GAP SERPL CALCULATED.3IONS-SCNC: 11 MMOL/L (ref 3–16)
AST SERPL-CCNC: 15 U/L (ref 15–37)
BARBITURATE SCREEN URINE: ABNORMAL
BASOPHILS ABSOLUTE: 0 K/UL (ref 0–0.2)
BASOPHILS RELATIVE PERCENT: 0.3 %
BENZODIAZEPINE SCREEN, URINE: ABNORMAL
BILIRUB SERPL-MCNC: 0.3 MG/DL (ref 0–1)
BUN BLDV-MCNC: 11 MG/DL (ref 7–20)
CALCIUM SERPL-MCNC: 9.7 MG/DL (ref 8.3–10.6)
CANNABINOID SCREEN URINE: POSITIVE
CHLORIDE BLD-SCNC: 108 MMOL/L (ref 99–110)
CO2: 26 MMOL/L (ref 21–32)
COCAINE METABOLITE SCREEN URINE: ABNORMAL
CREAT SERPL-MCNC: 0.8 MG/DL (ref 0.9–1.3)
EKG ATRIAL RATE: 115 BPM
EKG DIAGNOSIS: NORMAL
EKG P AXIS: 53 DEGREES
EKG P-R INTERVAL: 150 MS
EKG Q-T INTERVAL: 322 MS
EKG QRS DURATION: 82 MS
EKG QTC CALCULATION (BAZETT): 445 MS
EKG R AXIS: 69 DEGREES
EKG T AXIS: 38 DEGREES
EKG VENTRICULAR RATE: 115 BPM
EOSINOPHILS ABSOLUTE: 0 K/UL (ref 0–0.6)
EOSINOPHILS RELATIVE PERCENT: 0.5 %
GFR AFRICAN AMERICAN: >60
GFR NON-AFRICAN AMERICAN: >60
GLOBULIN: 2.6 G/DL
GLUCOSE BLD-MCNC: 133 MG/DL (ref 70–99)
HCT VFR BLD CALC: 41.9 % (ref 40.5–52.5)
HEMOGLOBIN: 13.9 G/DL (ref 13.5–17.5)
LYMPHOCYTES ABSOLUTE: 1.8 K/UL (ref 1–5.1)
LYMPHOCYTES RELATIVE PERCENT: 19.2 %
Lab: ABNORMAL
MCH RBC QN AUTO: 28.9 PG (ref 26–34)
MCHC RBC AUTO-ENTMCNC: 33.2 G/DL (ref 31–36)
MCV RBC AUTO: 87 FL (ref 80–100)
METHADONE SCREEN, URINE: ABNORMAL
MONOCYTES ABSOLUTE: 0.6 K/UL (ref 0–1.3)
MONOCYTES RELATIVE PERCENT: 6.4 %
NEUTROPHILS ABSOLUTE: 6.9 K/UL (ref 1.7–7.7)
NEUTROPHILS RELATIVE PERCENT: 73.6 %
OPIATE SCREEN URINE: ABNORMAL
OXYCODONE URINE: ABNORMAL
PDW BLD-RTO: 14 % (ref 12.4–15.4)
PH UA: 7
PHENCYCLIDINE SCREEN URINE: ABNORMAL
PLATELET # BLD: 223 K/UL (ref 135–450)
PMV BLD AUTO: 7.7 FL (ref 5–10.5)
POTASSIUM REFLEX MAGNESIUM: 3.8 MMOL/L (ref 3.5–5.1)
PROPOXYPHENE SCREEN: ABNORMAL
RBC # BLD: 4.82 M/UL (ref 4.2–5.9)
SODIUM BLD-SCNC: 145 MMOL/L (ref 136–145)
TOTAL PROTEIN: 7.1 G/DL (ref 6.4–8.2)
TROPONIN: <0.01 NG/ML
WBC # BLD: 9.3 K/UL (ref 4–11)

## 2021-02-12 PROCEDURE — 6370000000 HC RX 637 (ALT 250 FOR IP): Performed by: EMERGENCY MEDICINE

## 2021-02-12 PROCEDURE — 80307 DRUG TEST PRSMV CHEM ANLYZR: CPT

## 2021-02-12 PROCEDURE — 85025 COMPLETE CBC W/AUTO DIFF WBC: CPT

## 2021-02-12 PROCEDURE — 99284 EMERGENCY DEPT VISIT MOD MDM: CPT

## 2021-02-12 PROCEDURE — 84484 ASSAY OF TROPONIN QUANT: CPT

## 2021-02-12 PROCEDURE — 93010 ELECTROCARDIOGRAM REPORT: CPT | Performed by: INTERNAL MEDICINE

## 2021-02-12 PROCEDURE — 71046 X-RAY EXAM CHEST 2 VIEWS: CPT

## 2021-02-12 PROCEDURE — 80053 COMPREHEN METABOLIC PANEL: CPT

## 2021-02-12 PROCEDURE — 93005 ELECTROCARDIOGRAM TRACING: CPT | Performed by: EMERGENCY MEDICINE

## 2021-02-12 PROCEDURE — 2580000003 HC RX 258: Performed by: EMERGENCY MEDICINE

## 2021-02-12 RX ORDER — OLANZAPINE 5 MG/1
5 TABLET, ORALLY DISINTEGRATING ORAL EVERY 4 HOURS PRN
COMMUNITY
Start: 2021-02-08

## 2021-02-12 RX ORDER — ACETAMINOPHEN 500 MG
1000 TABLET ORAL ONCE
Status: COMPLETED | OUTPATIENT
Start: 2021-02-12 | End: 2021-02-12

## 2021-02-12 RX ORDER — 0.9 % SODIUM CHLORIDE 0.9 %
1000 INTRAVENOUS SOLUTION INTRAVENOUS ONCE
Status: COMPLETED | OUTPATIENT
Start: 2021-02-12 | End: 2021-02-12

## 2021-02-12 RX ORDER — QUETIAPINE FUMARATE 200 MG/1
200 TABLET, FILM COATED ORAL NIGHTLY
COMMUNITY
Start: 2021-02-01

## 2021-02-12 RX ORDER — HYDROXYZINE PAMOATE 25 MG/1
50 CAPSULE ORAL ONCE
Status: COMPLETED | OUTPATIENT
Start: 2021-02-12 | End: 2021-02-12

## 2021-02-12 RX ADMIN — HYDROXYZINE PAMOATE 50 MG: 25 CAPSULE ORAL at 02:18

## 2021-02-12 RX ADMIN — ACETAMINOPHEN 1000 MG: 500 TABLET ORAL at 02:18

## 2021-02-12 RX ADMIN — SODIUM CHLORIDE 1000 ML: 9 INJECTION, SOLUTION INTRAVENOUS at 02:18

## 2021-02-12 ASSESSMENT — ENCOUNTER SYMPTOMS
SHORTNESS OF BREATH: 1
EYE PAIN: 0
VOMITING: 0
ABDOMINAL PAIN: 0
CHEST TIGHTNESS: 1
DIARRHEA: 0

## 2021-02-12 ASSESSMENT — PAIN DESCRIPTION - PROGRESSION: CLINICAL_PROGRESSION: NOT CHANGED

## 2021-02-12 ASSESSMENT — PAIN DESCRIPTION - ORIENTATION: ORIENTATION: LEFT;ANTERIOR;MID

## 2021-02-12 ASSESSMENT — PAIN DESCRIPTION - DESCRIPTORS: DESCRIPTORS: HEAVINESS

## 2021-02-12 ASSESSMENT — PAIN SCALES - GENERAL
PAINLEVEL_OUTOF10: 0
PAINLEVEL_OUTOF10: 5

## 2021-02-12 ASSESSMENT — PAIN DESCRIPTION - FREQUENCY: FREQUENCY: CONTINUOUS

## 2021-02-12 NOTE — ED PROVIDER NOTES
[address] [facility]  [facility]      Pt Name: Jose Antonio Ryder  MRN: [id number]  [place] 1995  Date of evaluation: 2/12/2021  Provider: Richa Fernandes MD    CHIEF COMPLAINT       Chief Complaint   Patient presents with    Chest Pain    Anxiety         HISTORY OF PRESENT ILLNESS   (Location/Symptom, Timing/Onset,Context/Setting, Quality, Duration, Modifying Factors, Severity)  Note limiting factors. Jose Antonio Ryder is a 31 y.o. adult who presents to the emergency department for palpitations. This started approximately an hour and a half prior to arrival while the patient was asleep. Woke him up. He also feels tightness in the chest and he feels pain to the back of the neck and head. He feels somewhat short of breath and lightheaded. Since being here in the emergency room he feels like his symptoms are improving. He has a history of anxiety and bipolar disorder. He took Zyprexa prior to arrival but did not feel like it was helping. Nursing notes were reviewed. REVIEW OF SYSTEMS    (2-9 systems for level 4, 10 or more for level 5)     Review of Systems   Constitutional: Positive for unexpected weight change. This is currently being evaluated by his PCP   HENT: Negative for ear pain. Eyes: Negative for pain. Respiratory: Positive for chest tightness and shortness of breath. Cardiovascular: Negative for chest pain. Gastrointestinal: Negative for abdominal pain, diarrhea and vomiting. Endocrine: Negative for polyuria. Genitourinary: Negative for dysuria. Musculoskeletal: Positive for neck pain. Skin: Negative for rash. Neurological: Positive for light-headedness. Positive and pertinent negative as per HPI. Except as noted above in the ROS, all other systems were reviewed and were negative. PAST MEDICAL HISTORY   History reviewed. No pertinent past medical history.       SURGICALHISTORY       Past Surgical History: Procedure Laterality Date    OTHER SURGICAL HISTORY      fetal twin         CURRENT MEDICATIONS       Discharge Medication List as of 2/12/2021  3:51 AM      CONTINUE these medications which have NOT CHANGED    Details   QUEtiapine (SEROQUEL) 200 MG tablet Take 200 mg by mouth nightlyHistorical Med      OLANZapine zydis (ZYPREXA) 5 MG disintegrating tablet Take 5 mg by mouth every 4 hours as neededHistorical Med      lamoTRIgine (LAMICTAL) 200 MG tablet Take 1 tablet by mouth daily, Disp-30 tablet, R-1Normal      ARIPiprazole (ABILIFY) 2 MG tablet Take 2 mg by mouth dailyHistorical Med      !! mirtazapine (REMERON) 7.5 MG tablet Take 7.5 mg by mouth nightlyHistorical Med      hydrOXYzine (VISTARIL) 25 MG capsule Take 25 mg by mouth 3 times daily as neededHistorical Med      !! mirtazapine (REMERON) 7.5 MG tablet Take 1 tablet by mouth nightlyHistorical Med      vitamin D (ERGOCALCIFEROL) 1.25 MG (96841 UT) CAPS capsule Take 1 capsule by mouth once a week, Disp-12 capsule, R-1Normal      albuterol sulfate HFA (PROVENTIL HFA) 108 (90 Base) MCG/ACT inhaler Inhale 2 puffs into the lungs every 6 hours as needed for Wheezing, Disp-1 Inhaler, R-3Normal      testosterone cypionate (DEPOTESTOTERONE CYPIONATE) 200 MG/ML injection Inject 200 mg into the muscle every 7 days. Historical Med       !! - Potential duplicate medications found. Please discuss with provider.           ALLERGIES     Nicotine, Fish-derived products, and Shellfish allergy    FAMILY HISTORY       Family History   Problem Relation Age of Onset    Depression Mother     High Blood Pressure Mother     Cancer Father         skin    Mental Illness Father     Depression Father     Depression Maternal Grandmother         suicide    Early Death Maternal Grandmother     High Blood Pressure Maternal Grandmother     High Cholesterol Maternal Grandmother     Mental Illness Maternal Grandmother     Arthritis Maternal Grandfather  Heart Disease Maternal Grandfather     Kidney Disease Maternal Grandfather     Cancer Maternal Grandfather         lung    Cancer Paternal Grandmother         lung    Cancer Paternal Grandfather     Depression Brother     Early Death Brother     Mental Illness Brother     Substance Abuse Brother     Depression Maternal Uncle     Mental Illness Maternal Uncle     Substance Abuse Maternal Uncle           SOCIAL HISTORY       Social History     Socioeconomic History    Marital status:      Spouse name: None    Number of children: None    Years of education: None    Highest education level: None   Occupational History    None   Social Needs    Financial resource strain: Not hard at all   Gareth-Charlie insecurity     Worry: Never true     Inability: Never true    Transportation needs     Medical: No     Non-medical: No   Tobacco Use    Smoking status: Never Smoker    Smokeless tobacco: Never Used   Substance and Sexual Activity    Alcohol use: No     Alcohol/week: 0.0 standard drinks    Drug use: No    Sexual activity: Never   Lifestyle    Physical activity     Days per week: None     Minutes per session: None    Stress: None   Relationships    Social connections     Talks on phone: None     Gets together: None     Attends Sikhism service: None     Active member of club or organization: None     Attends meetings of clubs or organizations: None     Relationship status: None    Intimate partner violence     Fear of current or ex partner: None     Emotionally abused: None     Physically abused: None     Forced sexual activity: None   Other Topics Concern    None   Social History Narrative    10/2011 lives with parents and is senior in hs;doing well in school and in world;interested in working;       SCREENINGS             PHYSICAL EXAM    (up to 7 for level 4, 8 or more for level 5)     ED Triage Vitals   BP Temp Temp Source Pulse Resp SpO2 Height Weight 02/12/21 0200 02/12/21 0147 02/12/21 0147 02/12/21 0147 02/12/21 0147 02/12/21 0147 -- 02/12/21 0147   124/70 97.9 °F (36.6 °C) Oral 126 23 100 %  127 lb 4.8 oz (57.7 kg)       Physical Exam  Vitals signs and nursing note reviewed. Constitutional:       Appearance: Normal appearance. He is well-developed. He is not ill-appearing. HENT:      Head: Normocephalic and atraumatic. Right Ear: External ear normal.      Left Ear: External ear normal.      Nose: Nose normal.   Eyes:      General: No scleral icterus. Right eye: No discharge. Left eye: No discharge. Conjunctiva/sclera: Conjunctivae normal.   Neck:      Musculoskeletal: Neck supple. Cardiovascular:      Rate and Rhythm: Regular rhythm. Tachycardia present. Heart sounds: Normal heart sounds. Pulmonary:      Effort: Pulmonary effort is normal. No respiratory distress. Breath sounds: Normal breath sounds. No wheezing or rales. Abdominal:      General: Bowel sounds are normal. There is no distension. Palpations: Abdomen is soft. Tenderness: There is no abdominal tenderness. There is no guarding or rebound. Musculoskeletal:         General: No swelling, tenderness, deformity or signs of injury. Skin:     Coloration: Skin is not pale. Neurological:      Mental Status: He is alert. Comments: Pupils equally round and reactive to light, extraocular muscles intact, no facial asymmetry, no dysarthria, normal strength bilateral upper extremities no ataxia of the limbs   Psychiatric:         Mood and Affect: Mood normal.         Behavior: Behavior normal.           DIAGNOSTIC RESULTS     EKG: All EKG's are interpreted by the Emergency Department Physician who either signs or Co-signs this chart in the absence of a cardiologist.    12 lead EKG shows sinus tachycardia at a rate of 115 bpm comparing to both QRS and QTc normal.  No previous for comparison.     RADIOLOGY: Non-plain film images such as CT, Ultrasound and MRI are read by the radiologist. Plain radiographic images are visualized and preliminarily interpreted by the emergency physician with the below findings:        Interpretation per the Radiologist below, if available at the time of this note:    XR CHEST (2 VW)   Final Result     No acute cardiopulmonary process. ED BEDSIDE ULTRASOUND:   Performed by ED Physician - none    LABS:  Labs Reviewed   COMPREHENSIVE METABOLIC PANEL W/ REFLEX TO MG FOR LOW K - Abnormal; Notable for the following components:       Result Value    Glucose 133 (*)     CREATININE 0.8 (*)     All other components within normal limits    Narrative:     Performed at:  [place] [address]   Phone [phone]   [address] - Abnormal; Notable for the following components:    Cannabinoid Scrn, Ur POSITIVE (*)     All other components within normal limits    Narrative:     Performed at:  [place] [address],  [facility] [address]   Phone [phone]   CBC WITH AUTO DIFFERENTIAL    Narrative:     Performed at:  [place] [address],  [facility] [address]   Phone [phone]   TROPONIN    Narrative:     Performed at:  [place] [address],  [facility] [address]   Phone [phone]       All other labs were within normal range or not returned as of this dictation. EMERGENCY DEPARTMENT COURSE and DIFFERENTIAL DIAGNOSIS/MDM:   Vitals:    Vitals:    02/12/21 0200 02/12/21 0230 02/12/21 0300 02/12/21 0357   BP: 124/70 124/79 124/75 124/75   Pulse: 96 91 90 89   Resp: 16 18  18   Temp:       TempSrc:       SpO2: 100% 99% 100% 100%   Weight:           Adult male who comes in of palpitations. Because of this the patient is placed on cardiac monitor. Cardiac interpretation by myself shows sinus tachycardia at a rate approximately 105/min. Laboratory studies chest x-ray and EKG ordered. EKG shows sinus tachycardia. Laboratory studies obtained by nursing staff. Patient is given IV fluids and is also given hydroxyzine and acetaminophen. Laboratory studies show no acute process chest x-ray also negative for acute process. EKG shows no acute cardiac process. Patient is reassessed patient feels better. Patient's tachycardia is improved. Cardiac interpretation is by myself shows normal sinus rhythm. Patient will be discharged with follow-up in the primary care setting. Based on history physical exam and diagnostic work-up with the patient is low risk for serious life-threatening conditions that would require further work-up here in the emergency room at this time. CRITICAL CARE TIME   None       CONSULTS:  None    PROCEDURES:  Unless otherwise noted above, none     Procedures    FINAL IMPRESSION      1. Palpitations    2.  Acute nonintractable headache, unspecified headache type          DISPOSITION/PLAN   DISPOSITION Decision To Discharge 02/12/2021 03:34:18 AM      PATIENT REFERREDTO:  [place] [phone]    Schedule an appointment as soon as possible for a visit         DISCHARGEMEDICATIONS:  Discharge Medication List as of 2/12/2021  3:51 AM             (Please note that portions of this note were completed with a voice recognition program.  Efforts were made to edit the dictations but occasionally words are mis-transcribed.)    Allison Romero MD (electronically signed)  Attending Emergency Physician       Allison Romero MD  02/12/21 0411       Allison Romero MD  02/12/21 7012

## 2021-02-12 NOTE — ED TRIAGE NOTES
Onset 1 1/2 hrs PTA. C/O heaviness in chest and anxiety that woke him up. Took his Zyprexa for anxiety which has not helped.

## 2021-11-01 ENCOUNTER — RX ONLY (OUTPATIENT)
Age: 26
Setting detail: RX ONLY
End: 2021-11-01

## 2022-08-01 ENCOUNTER — RX ONLY (OUTPATIENT)
Age: 27
Setting detail: RX ONLY
End: 2022-08-01

## 2022-12-10 ENCOUNTER — RX ONLY (OUTPATIENT)
Age: 27
Setting detail: RX ONLY
End: 2022-12-10

## 2022-12-14 ENCOUNTER — APPOINTMENT (RX ONLY)
Dept: URBAN - METROPOLITAN AREA CLINIC 78 | Facility: CLINIC | Age: 27
Setting detail: DERMATOLOGY
End: 2022-12-14

## 2022-12-14 DIAGNOSIS — L82.0 INFLAMED SEBORRHEIC KERATOSIS: ICD-10-CM

## 2022-12-14 DIAGNOSIS — L81.4 OTHER MELANIN HYPERPIGMENTATION: ICD-10-CM

## 2022-12-14 PROBLEM — D48.5 NEOPLASM OF UNCERTAIN BEHAVIOR OF SKIN: Status: ACTIVE | Noted: 2022-12-14

## 2022-12-14 PROCEDURE — ? LIQUID NITROGEN

## 2022-12-14 PROCEDURE — ? OBSERVATION

## 2022-12-14 PROCEDURE — 17110 DESTRUCTION B9 LES UP TO 14: CPT

## 2022-12-14 PROCEDURE — ? COUNSELING

## 2022-12-14 PROCEDURE — ? PHOTO-DOCUMENTATION

## 2022-12-14 PROCEDURE — 99202 OFFICE O/P NEW SF 15 MIN: CPT | Mod: 25

## 2022-12-14 ASSESSMENT — LOCATION DETAILED DESCRIPTION DERM
LOCATION DETAILED: LEFT SUPERIOR POSTAURICULAR SKIN
LOCATION DETAILED: RIGHT SUPERIOR LATERAL MIDBACK

## 2022-12-14 ASSESSMENT — LOCATION SIMPLE DESCRIPTION DERM
LOCATION SIMPLE: SCALP
LOCATION SIMPLE: RIGHT LOWER BACK

## 2022-12-14 ASSESSMENT — LOCATION ZONE DERM
LOCATION ZONE: TRUNK
LOCATION ZONE: SCALP

## 2022-12-14 NOTE — PROCEDURE: LIQUID NITROGEN
Spray Paint Text: The liquid nitrogen was applied to the skin utilizing a spray paint frosting technique.
Add 52 Modifier (Optional): no
Consent: The patient's consent was obtained including but not limited to risks of crusting, scabbing, blistering, scarring, darker or lighter pigmentary change, recurrence, incomplete removal and infection.
Show Spray Paint Technique Variable?: Yes
Medical Necessity Information: It is in your best interest to select a reason for this procedure from the list below. All of these items fulfill various CMS LCD requirements except the new and changing color options.
Medical Necessity Clause: This procedure was medically necessary because the lesions that were treated were:
Post-Care Instructions: I reviewed with the patient in detail post-care instructions. Patient is to wear sunprotection, and avoid picking at any of the treated lesions. Pt may apply Vaseline to crusted or scabbing areas.
Detail Level: Detailed

## 2022-12-14 NOTE — PROCEDURE: MIPS QUALITY
Detail Level: Detailed
Quality 130: Documentation Of Current Medications In The Medical Record: Current Medications Documented
Quality 47: Advance Care Plan: Advance care planning not documented, reason not otherwise specified.
Quality 110: Preventive Care And Screening: Influenza Immunization: Influenza Immunization not Administered for Documented Reasons.
Quality 431: Preventive Care And Screening: Unhealthy Alcohol Use - Screening: Patient not identified as an unhealthy alcohol user when screened for unhealthy alcohol use using a systematic screening method
Quality 226: Preventive Care And Screening: Tobacco Use: Screening And Cessation Intervention: Patient screened for tobacco use and is an ex/non-smoker

## 2022-12-14 NOTE — HPI: SKIN LESION
What Type Of Note Output Would You Prefer (Optional)?: Standard Output
How Severe Is Your Skin Lesion?: mild
Has Your Skin Lesion Been Treated?: not been treated
Is This A New Presentation, Or A Follow-Up?: Moles
Which Family Member (Optional)?: Father and grandfather

## 2024-02-14 ENCOUNTER — HOSPITAL ENCOUNTER (EMERGENCY)
Age: 29
Discharge: HOME | End: 2024-02-14
Attending: EMERGENCY MEDICINE
Payer: COMMERCIAL

## 2024-02-14 ENCOUNTER — APPOINTMENT (OUTPATIENT)
Dept: CT IMAGING | Age: 29
End: 2024-02-14
Payer: COMMERCIAL

## 2024-02-14 VITALS
SYSTOLIC BLOOD PRESSURE: 128 MMHG | BODY MASS INDEX: 30.36 KG/M2 | HEART RATE: 87 BPM | TEMPERATURE: 97.7 F | OXYGEN SATURATION: 99 % | WEIGHT: 166 LBS | DIASTOLIC BLOOD PRESSURE: 87 MMHG | RESPIRATION RATE: 18 BRPM

## 2024-02-14 DIAGNOSIS — R10.9 FLANK PAIN: Primary | ICD-10-CM

## 2024-02-14 LAB
ALBUMIN SERPL-MCNC: 4.8 G/DL (ref 3.4–5)
ALBUMIN/GLOB SERPL: 1.5 {RATIO} (ref 1.1–2.2)
ALP SERPL-CCNC: 109 U/L (ref 40–129)
ALT SERPL-CCNC: 13 U/L (ref 10–40)
ANION GAP SERPL CALCULATED.3IONS-SCNC: 14 MMOL/L (ref 3–16)
AST SERPL-CCNC: 15 U/L (ref 15–37)
BASOPHILS # BLD: 0 K/UL (ref 0–0.2)
BASOPHILS NFR BLD: 0.3 %
BILIRUB SERPL-MCNC: 0.4 MG/DL (ref 0–1)
BUN SERPL-MCNC: 9 MG/DL (ref 7–20)
CALCIUM SERPL-MCNC: 10.3 MG/DL (ref 8.3–10.6)
CHLORIDE SERPL-SCNC: 107 MMOL/L (ref 99–110)
CO2 SERPL-SCNC: 25 MMOL/L (ref 21–32)
CREAT SERPL-MCNC: 1 MG/DL (ref 0.9–1.3)
DEPRECATED RDW RBC AUTO: 14.7 % (ref 12.4–15.4)
EOSINOPHIL # BLD: 0 K/UL (ref 0–0.6)
EOSINOPHIL NFR BLD: 0.2 %
GFR SERPLBLD CREATININE-BSD FMLA CKD-EPI: >60 ML/MIN/{1.73_M2}
GLUCOSE SERPL-MCNC: 107 MG/DL (ref 70–99)
HCT VFR BLD AUTO: 42 % (ref 40.5–52.5)
HGB BLD-MCNC: 14.4 G/DL (ref 13.5–17.5)
LYMPHOCYTES # BLD: 2.7 K/UL (ref 1–5.1)
LYMPHOCYTES NFR BLD: 37.9 %
MCH RBC QN AUTO: 29.5 PG (ref 26–34)
MCHC RBC AUTO-ENTMCNC: 34.3 G/DL (ref 31–36)
MCV RBC AUTO: 86 FL (ref 80–100)
MONOCYTES # BLD: 0.6 K/UL (ref 0–1.3)
MONOCYTES NFR BLD: 8.1 %
NEUTROPHILS # BLD: 3.8 K/UL (ref 1.7–7.7)
NEUTROPHILS NFR BLD: 53.5 %
PLATELET # BLD AUTO: 282 K/UL (ref 135–450)
PMV BLD AUTO: 7.7 FL (ref 5–10.5)
POTASSIUM SERPL-SCNC: 3.6 MMOL/L (ref 3.5–5.1)
PROT SERPL-MCNC: 7.9 G/DL (ref 6.4–8.2)
RBC # BLD AUTO: 4.89 M/UL (ref 4.2–5.9)
SODIUM SERPL-SCNC: 146 MMOL/L (ref 136–145)
WBC # BLD AUTO: 7.2 K/UL (ref 4–11)

## 2024-02-14 PROCEDURE — 85025 COMPLETE CBC W/AUTO DIFF WBC: CPT

## 2024-02-14 PROCEDURE — 99284 EMERGENCY DEPT VISIT MOD MDM: CPT

## 2024-02-14 PROCEDURE — 74176 CT ABD & PELVIS W/O CONTRAST: CPT

## 2024-02-14 PROCEDURE — 80053 COMPREHEN METABOLIC PANEL: CPT

## 2024-02-14 PROCEDURE — 6360000002 HC RX W HCPCS: Performed by: EMERGENCY MEDICINE

## 2024-02-14 PROCEDURE — 96374 THER/PROPH/DIAG INJ IV PUSH: CPT

## 2024-02-14 RX ORDER — KETOROLAC TROMETHAMINE 30 MG/ML
30 INJECTION, SOLUTION INTRAMUSCULAR; INTRAVENOUS ONCE
Status: COMPLETED | OUTPATIENT
Start: 2024-02-14 | End: 2024-02-14

## 2024-02-14 RX ADMIN — KETOROLAC TROMETHAMINE 30 MG: 30 INJECTION, SOLUTION INTRAMUSCULAR; INTRAVENOUS at 03:55

## 2024-02-14 NOTE — DISCHARGE INSTR - COC
Continuity of Care Form    Patient Name: Jarrett Pizarro   :  1995  MRN:  [id number]    Admit date:  2024  Discharge date:  ***    Code Status Order: No Order   Advance Directives:     Admitting Physician:  No admitting provider for patient encounter.  PCP: Zainab Stahl, APRN - CNP    Discharging Nurse: ***  Discharging Hospital Unit/Room#:   Discharging Unit Phone Number: ***    Emergency Contact:   Extended Emergency Contact Information  Primary Emergency Contact: Chantal Pizarro   [facility]  Home Phone: [phone]  Relation: Other    Past Surgical History:  Past Surgical History:   Procedure Laterality Date    OTHER SURGICAL HISTORY      fetal twin       Immunization History:   Immunization History   Administered Date(s) Administered    Influenza 10/13/2011, 2015    Influenza Virus Vaccine 10/18/2019       Active Problems:  Patient Active Problem List   Diagnosis Code    Anxiety F41.9    Vitamin D deficiency E55.9    Nevus D22.9    Allergic rhinitis J30.9    Generalized anxiety disorder F41.1    Psychophysiological insomnia F51.04    Gender dysphoria F64.9    Fatigue R53.83    Bipolar disorder with severe depression (HCC) F31.4       Isolation/Infection:   Isolation            No Isolation          Patient Infection Status       None to display                     Nurse Assessment:  Last Vital Signs: /87   Pulse (!) 108   Temp 97.7 °F (36.5 °C) (Oral)   Resp 18   Wt 75.3 kg (166 lb)   SpO2 99%   BMI 30.36 kg/m²     Last documented pain score (0-10 scale): Pain Level: 6  Last Weight:   Wt Readings from Last 1 Encounters:   24 75.3 kg (166 lb)     Mental Status:  {IP PT MENTAL STATUS:43710}    IV Access:  { TONY IV ACCESS:795884709}    Nursing Mobility/ADLs:  Walking   {CHP DME ADLs:335657430}  Transfer  {CHP DME ADLs:255848439}  Bathing  {CHP DME ADLs:762792301}  Dressing  {CHP DME ADLs:886975405}  Toileting  {CHP DME ADLs:972694579}  Feeding  {CHP DME

## 2024-02-14 NOTE — ED PROVIDER NOTES
[facility] EMERGENCY DEPARTMENT  eMERGENCY dEPARTMENT eNCOUnter      Pt Name: Jarrett Pizarro  MRN: [id number]  Birthdate 1995  Date of evaluation: 2/14/2024  Provider: Blayne Almaguer MD  PCP: Zainab Stahl, APRN - CNP      CHIEF COMPLAINT       Kidney stone    HISTORY OFPRESENT ILLNESS   (Location/Symptom, Timing/Onset, Context/Setting, Quality, Duration, Modifying Factors,Severity)  Note limiting factors.     Jarrett Pizarro is a 28 y.o. adult who is concerned that he might have a kidney stone he has a history of kidney stones and he is complaining of some left-sided flank discomfort as well as some lower abdominal cramps and having more difficulty urinating this is a chart [place] gender female.  Denies any vomiting denies any fever.  Does have an IUD in    Nursing Notes were all reviewed and agreed with or any disagreements were addressed  in the HPI.    REVIEW OF SYSTEMS    (2-9 systems for level 4, 10 or more for level 5)     Review of Systems    Positives and Pertinent negatives as per HPI.  Except as noted above in the ROS, all other systems were reviewed andnegative.       PASTMEDICAL HISTORY   No past medical history on file.      SURGICAL HISTORY       Past Surgical History:   Procedure Laterality Date    OTHER SURGICAL HISTORY      fetal twin         CURRENT MEDICATIONS       Previous Medications    ALBUTEROL SULFATE HFA (PROVENTIL HFA) 108 (90 BASE) MCG/ACT INHALER    Inhale 2 puffs into the lungs every 6 hours as needed for Wheezing    ARIPIPRAZOLE (ABILIFY) 2 MG TABLET    Take 2 mg by mouth daily    HYDROXYZINE (VISTARIL) 25 MG CAPSULE    Take 25 mg by mouth 3 times daily as needed    LAMOTRIGINE (LAMICTAL) 200 MG TABLET    Take 1 tablet by mouth daily    MIRTAZAPINE (REMERON) 7.5 MG TABLET    Take 7.5 mg by mouth nightly    MIRTAZAPINE (REMERON) 7.5 MG TABLET    Take 1 tablet by mouth nightly    OLANZAPINE ZYDIS (ZYPREXA) 5 MG DISINTEGRATING TABLET    Take 5 mg by mouth every 4

## 2024-03-05 ENCOUNTER — HOSPITAL ENCOUNTER (EMERGENCY)
Age: 29
Discharge: ANOTHER HOSPITAL | End: 2024-03-05
Attending: EMERGENCY MEDICINE
Payer: COMMERCIAL

## 2024-03-05 VITALS
RESPIRATION RATE: 14 BRPM | TEMPERATURE: 98.6 F | DIASTOLIC BLOOD PRESSURE: 87 MMHG | OXYGEN SATURATION: 98 % | HEIGHT: 62 IN | HEART RATE: 80 BPM | WEIGHT: 167.1 LBS | SYSTOLIC BLOOD PRESSURE: 131 MMHG | BODY MASS INDEX: 30.75 KG/M2

## 2024-03-05 DIAGNOSIS — R45.851 SUICIDAL IDEATION: Primary | ICD-10-CM

## 2024-03-05 DIAGNOSIS — U07.1 COVID: ICD-10-CM

## 2024-03-05 PROBLEM — F32.9 MAJOR DEPRESSIVE EPISODE: Status: ACTIVE | Noted: 2024-03-05

## 2024-03-05 LAB
ALBUMIN SERPL-MCNC: 4.8 G/DL (ref 3.4–5)
ALBUMIN/GLOB SERPL: 1.9 {RATIO} (ref 1.1–2.2)
ALP SERPL-CCNC: 105 U/L (ref 40–129)
ALT SERPL-CCNC: 24 U/L (ref 10–40)
AMPHETAMINES UR QL SCN>1000 NG/ML: ABNORMAL
ANION GAP SERPL CALCULATED.3IONS-SCNC: 12 MMOL/L (ref 3–16)
APAP SERPL-MCNC: <5 UG/ML (ref 10–30)
AST SERPL-CCNC: 19 U/L (ref 15–37)
BARBITURATES UR QL SCN>200 NG/ML: ABNORMAL
BASOPHILS # BLD: 0 K/UL (ref 0–0.2)
BASOPHILS NFR BLD: 0.5 %
BENZODIAZ UR QL SCN>200 NG/ML: ABNORMAL
BILIRUB SERPL-MCNC: <0.2 MG/DL (ref 0–1)
BUN SERPL-MCNC: 7 MG/DL (ref 7–20)
CALCIUM SERPL-MCNC: 9.9 MG/DL (ref 8.3–10.6)
CANNABINOIDS UR QL SCN>50 NG/ML: POSITIVE
CHLORIDE SERPL-SCNC: 108 MMOL/L (ref 99–110)
CO2 SERPL-SCNC: 25 MMOL/L (ref 21–32)
COCAINE UR QL SCN: ABNORMAL
CREAT SERPL-MCNC: 0.9 MG/DL (ref 0.9–1.3)
DEPRECATED RDW RBC AUTO: 14.4 % (ref 12.4–15.4)
DRUG SCREEN COMMENT UR-IMP: ABNORMAL
EOSINOPHIL # BLD: 0 K/UL (ref 0–0.6)
EOSINOPHIL NFR BLD: 0.1 %
ETHANOLAMINE SERPL-MCNC: NORMAL MG/DL (ref 0–0.08)
FENTANYL SCREEN, URINE: ABNORMAL
FLUAV RNA UPPER RESP QL NAA+PROBE: NEGATIVE
FLUBV AG NPH QL: NEGATIVE
GFR SERPLBLD CREATININE-BSD FMLA CKD-EPI: >60 ML/MIN/{1.73_M2}
GLUCOSE SERPL-MCNC: 98 MG/DL (ref 70–99)
HCG SERPL QL: NEGATIVE
HCT VFR BLD AUTO: 41.5 % (ref 40.5–52.5)
HGB BLD-MCNC: 14 G/DL (ref 13.5–17.5)
LYMPHOCYTES # BLD: 1.6 K/UL (ref 1–5.1)
LYMPHOCYTES NFR BLD: 28.1 %
MCH RBC QN AUTO: 29.5 PG (ref 26–34)
MCHC RBC AUTO-ENTMCNC: 33.9 G/DL (ref 31–36)
MCV RBC AUTO: 87 FL (ref 80–100)
METHADONE UR QL SCN>300 NG/ML: ABNORMAL
MONOCYTES # BLD: 0.3 K/UL (ref 0–1.3)
MONOCYTES NFR BLD: 5.1 %
NEUTROPHILS # BLD: 3.7 K/UL (ref 1.7–7.7)
NEUTROPHILS NFR BLD: 66.2 %
OPIATES UR QL SCN>300 NG/ML: ABNORMAL
OXYCODONE UR QL SCN: ABNORMAL
PCP UR QL SCN>25 NG/ML: ABNORMAL
PH UR STRIP: 7 [PH]
PLATELET # BLD AUTO: 262 K/UL (ref 135–450)
PMV BLD AUTO: 7.6 FL (ref 5–10.5)
POTASSIUM SERPL-SCNC: 4.3 MMOL/L (ref 3.5–5.1)
PROT SERPL-MCNC: 7.3 G/DL (ref 6.4–8.2)
RBC # BLD AUTO: 4.77 M/UL (ref 4.2–5.9)
SALICYLATES SERPL-MCNC: <0.3 MG/DL (ref 15–30)
SARS-COV-2 RDRP RESP QL NAA+PROBE: DETECTED
SODIUM SERPL-SCNC: 145 MMOL/L (ref 136–145)
WBC # BLD AUTO: 5.6 K/UL (ref 4–11)

## 2024-03-05 PROCEDURE — 80143 DRUG ASSAY ACETAMINOPHEN: CPT

## 2024-03-05 PROCEDURE — 80307 DRUG TEST PRSMV CHEM ANLYZR: CPT

## 2024-03-05 PROCEDURE — 82077 ASSAY SPEC XCP UR&BREATH IA: CPT

## 2024-03-05 PROCEDURE — 80053 COMPREHEN METABOLIC PANEL: CPT

## 2024-03-05 PROCEDURE — 85025 COMPLETE CBC W/AUTO DIFF WBC: CPT

## 2024-03-05 PROCEDURE — 93005 ELECTROCARDIOGRAM TRACING: CPT | Performed by: EMERGENCY MEDICINE

## 2024-03-05 PROCEDURE — 80179 DRUG ASSAY SALICYLATE: CPT

## 2024-03-05 PROCEDURE — 87804 INFLUENZA ASSAY W/OPTIC: CPT

## 2024-03-05 PROCEDURE — 90791 PSYCH DIAGNOSTIC EVALUATION: CPT | Performed by: SOCIAL WORKER

## 2024-03-05 PROCEDURE — 99285 EMERGENCY DEPT VISIT HI MDM: CPT

## 2024-03-05 PROCEDURE — 87635 SARS-COV-2 COVID-19 AMP PRB: CPT

## 2024-03-05 PROCEDURE — 84703 CHORIONIC GONADOTROPIN ASSAY: CPT

## 2024-03-05 RX ORDER — BUSPIRONE HYDROCHLORIDE 15 MG/1
15 TABLET ORAL 2 TIMES DAILY
COMMUNITY

## 2024-03-05 ASSESSMENT — ENCOUNTER SYMPTOMS
ABDOMINAL PAIN: 0
SORE THROAT: 0
EYE REDNESS: 0
SHORTNESS OF BREATH: 0
RHINORRHEA: 0

## 2024-03-05 ASSESSMENT — PAIN - FUNCTIONAL ASSESSMENT: PAIN_FUNCTIONAL_ASSESSMENT: NONE - DENIES PAIN

## 2024-03-05 NOTE — ED NOTES
Pt presents today with suicidal ideation. Denies a current plan to hurt self, states he has attempted suicide in the past. (2022). Pt is alert and oriented, cooperative, and answers questions appropriately during triage. Current medication list reviewed with patient. Pt states much of his psychiatric care has been through the [facility]. Pt changed into safety gown, furniture and cords removed from room, pt belongings labeled, bagged and placed at nurse's station. Constant observer at bedside. Telepsych consulted and placed at bedside.

## 2024-03-05 NOTE — ED NOTES
Pt report called to Nurse Rocío at [facility]. ETA for transport, 1830. Pt belongings bag retrieved from pt's vehicle at pt' request. Pt states that these are clothes to wear upon d/c from facility. Bag labeled and placed with other belongings in nurse's station.

## 2024-03-05 NOTE — VIRTUAL HEALTH
admission.  Prior to Admission medications    Medication Sig Start Date End Date Taking? Authorizing Provider   busPIRone (BUSPAR) 15 MG tablet Take 15 mg by mouth 2 times daily   Yes Alfred Guzman MD   QUEtiapine (SEROQUEL) 200 MG tablet Take 200 mg by mouth nightly  Patient not taking: Reported on 3/5/2024 2/1/21   Alfred Guzman MD   OLANZapine zydis (ZYPREXA) 5 MG disintegrating tablet Take 5 mg by mouth every 4 hours as needed  Patient not taking: Reported on 3/5/2024 2/8/21   Alfred Guzman MD   ARIPiprazole (ABILIFY) 2 MG tablet Take 2 mg by mouth daily  Patient not taking: Reported on 3/5/2024 12/22/20   Alfred Guzman MD   mirtazapine (REMERON) 7.5 MG tablet Take 1 tablet by mouth nightly 12/21/20   Alfred Guzman MD   hydrOXYzine (VISTARIL) 25 MG capsule Take 25 mg by mouth 3 times daily as needed  Patient not taking: Reported on 3/5/2024 12/21/20   Alfred Guzman MD   mirtazapine (REMERON) 7.5 MG tablet Take 1 tablet by mouth nightly  Patient not taking: Reported on 3/5/2024 12/21/20   Alfred Guzman MD   vitamin D (ERGOCALCIFEROL) 1.25 MG (07562 UT) CAPS capsule Take 1 capsule by mouth once a week  Patient not taking: Reported on 3/5/2024 12/30/20   Zainab Stahl APRN - CNP   albuterol sulfate HFA (PROVENTIL HFA) 108 (90 Base) MCG/ACT inhaler Inhale 2 puffs into the lungs every 6 hours as needed for Wheezing  Patient not taking: Reported on 3/5/2024 12/16/20   Zainab Stahl APRN - CNP   lamoTRIgine (LAMICTAL) 200 MG tablet Take 1 tablet by mouth daily  Patient taking differently: Take 150 mg by mouth 2 times daily 12/16/20   Zainab Stahl APRN - CNP   testosterone cypionate (DEPOTESTOTERONE CYPIONATE) 200 MG/ML injection Inject 0.3 mLs into the muscle every 7 days. 6/17/20   Alfred Guzman MD        Labs:  UDS: not available  ETOH: not available  HCG: Unknown      Mental Status Exam:  Level of consciousness:  within normal limits

## 2024-03-05 NOTE — ED PROVIDER NOTES
Left eye: No discharge.      Conjunctiva/sclera: Conjunctivae normal.   Cardiovascular:      Rate and Rhythm: Normal rate.      Heart sounds: No murmur heard.  Pulmonary:      Effort: Pulmonary effort is normal. No respiratory distress.      Breath sounds: Normal breath sounds.   Abdominal:      Palpations: Abdomen is soft.      Tenderness: There is no abdominal tenderness.   Musculoskeletal:         General: Normal range of motion.      Cervical back: Neck supple.   Skin:     General: Skin is warm and dry.      Capillary Refill: Capillary refill takes less than 2 seconds.   Neurological:      Mental Status: He is alert and oriented to person, place, and time.   Psychiatric:         Mood and Affect: Mood is depressed. Affect is tearful.         Thought Content: Thought content includes suicidal ideation.       ___    DIAGNOSTIC RESULTS     LABS:   Labs Reviewed   COVID-19, RAPID - Abnormal; Notable for the following components:       Result Value    SARS-CoV-2, NAAT DETECTED (*)     All other components within normal limits   ACETAMINOPHEN LEVEL - Abnormal; Notable for the following components:    Acetaminophen Level <5 (*)     All other components within normal limits   SALICYLATE LEVEL - Abnormal; Notable for the following components:    Salicylate, Serum <0.3 (*)     All other components within normal limits   URINE DRUG SCREEN - Abnormal; Notable for the following components:    Cannabinoid Scrn, Ur POSITIVE (*)     All other components within normal limits   RAPID INFLUENZA A/B ANTIGENS   CBC WITH AUTO DIFFERENTIAL   COMPREHENSIVE METABOLIC PANEL W/ REFLEX TO MG FOR LOW K   ETHANOL   HCG, SERUM, QUALITATIVE      When ordered only abnormal lab results are displayed. All other labs were within normal range or not returned as of this dictation.     RADIOLOGY:      Non-plain film images such as CT, Ultrasound and MRI are read by the radiologist. Plain radiographic images are visualized and preliminarily

## 2024-03-06 LAB
EKG ATRIAL RATE: 74 BPM
EKG DIAGNOSIS: NORMAL
EKG P AXIS: 32 DEGREES
EKG P-R INTERVAL: 174 MS
EKG Q-T INTERVAL: 390 MS
EKG QRS DURATION: 88 MS
EKG QTC CALCULATION (BAZETT): 432 MS
EKG R AXIS: 28 DEGREES
EKG T AXIS: 19 DEGREES
EKG VENTRICULAR RATE: 74 BPM

## 2024-03-06 PROCEDURE — 93010 ELECTROCARDIOGRAM REPORT: CPT | Performed by: INTERNAL MEDICINE
